# Patient Record
Sex: FEMALE | Race: WHITE | Employment: PART TIME | URBAN - METROPOLITAN AREA
[De-identification: names, ages, dates, MRNs, and addresses within clinical notes are randomized per-mention and may not be internally consistent; named-entity substitution may affect disease eponyms.]

---

## 2018-08-06 ENCOUNTER — HOSPITAL ENCOUNTER (OUTPATIENT)
Age: 38
Discharge: HOME | End: 2018-08-06
Attending: FAMILY MEDICINE
Payer: COMMERCIAL

## 2018-08-06 VITALS
DIASTOLIC BLOOD PRESSURE: 67 MMHG | TEMPERATURE: 98 F | SYSTOLIC BLOOD PRESSURE: 105 MMHG | HEIGHT: 65 IN | BODY MASS INDEX: 24.99 KG/M2 | WEIGHT: 150 LBS | RESPIRATION RATE: 18 BRPM | OXYGEN SATURATION: 100 % | HEART RATE: 65 BPM

## 2018-08-06 DIAGNOSIS — J02.0 STREPTOCOCCAL SORE THROAT: Primary | ICD-10-CM

## 2018-08-06 LAB — S PYO AG THROAT QL: NEGATIVE

## 2018-08-06 PROCEDURE — 99204 OFFICE O/P NEW MOD 45 MIN: CPT

## 2018-08-06 PROCEDURE — 87430 STREP A AG IA: CPT

## 2018-08-06 PROCEDURE — 87081 CULTURE SCREEN ONLY: CPT | Performed by: FAMILY MEDICINE

## 2018-08-06 PROCEDURE — 99203 OFFICE O/P NEW LOW 30 MIN: CPT

## 2018-08-06 RX ORDER — LEVOTHYROXINE SODIUM 175 UG/1
175 TABLET ORAL
COMMUNITY
End: 2019-06-17

## 2018-08-06 NOTE — ED INITIAL ASSESSMENT (HPI)
Pt here with c/o sore throat that started this am. Son was dx with strep yesterday. Denies fevers.  Pt is 20 weeks pregnant

## 2018-08-06 NOTE — ED PROVIDER NOTES
Patient Seen in: [address]    History   Patient presents with:  Sore Throat    Stated Complaint: sore throat    HPI    Patient here with sore throat for <1 days. No travel,son is sick contact.   Patient denies sig short edema    SKIN: good skin turgor, no obvious rashes  DDX: strep vs. Viral pharyngitis vs. uri    ED Course     Labs Reviewed   EMH POCT RAPID STREP - Normal   GRP A STREP CULT, THROAT       MDM     45year-old with less than 12 hours of throat pain.   Shira Hernández

## 2019-06-11 ENCOUNTER — OFFICE VISIT (OUTPATIENT)
Dept: FAMILY MEDICINE CLINIC | Facility: CLINIC | Age: 39
End: 2019-06-11
Payer: COMMERCIAL

## 2019-06-11 ENCOUNTER — LAB ENCOUNTER (OUTPATIENT)
Dept: LAB | Facility: REFERENCE LAB | Age: 39
End: 2019-06-11
Attending: FAMILY MEDICINE
Payer: COMMERCIAL

## 2019-06-11 ENCOUNTER — HOSPITAL ENCOUNTER (OUTPATIENT)
Dept: GENERAL RADIOLOGY | Age: 39
Discharge: HOME | End: 2019-06-11
Attending: FAMILY MEDICINE
Payer: COMMERCIAL

## 2019-06-11 VITALS
DIASTOLIC BLOOD PRESSURE: 70 MMHG | HEART RATE: 75 BPM | WEIGHT: 143 LBS | OXYGEN SATURATION: 97 % | BODY MASS INDEX: 23.82 KG/M2 | HEIGHT: 65 IN | SYSTOLIC BLOOD PRESSURE: 100 MMHG

## 2019-06-11 DIAGNOSIS — R07.9 CHEST PAIN, UNSPECIFIED TYPE: ICD-10-CM

## 2019-06-11 DIAGNOSIS — E03.9 HYPOTHYROIDISM (ACQUIRED): ICD-10-CM

## 2019-06-11 DIAGNOSIS — Z00.01 ENCOUNTER FOR ROUTINE ADULT HEALTH EXAMINATION WITH ABNORMAL FINDINGS: ICD-10-CM

## 2019-06-11 DIAGNOSIS — K64.4 EXTERNAL HEMORRHOIDS: ICD-10-CM

## 2019-06-11 DIAGNOSIS — Z87.898 HISTORY OF ATYPICAL NEVUS: ICD-10-CM

## 2019-06-11 DIAGNOSIS — Z00.01 ENCOUNTER FOR ROUTINE ADULT HEALTH EXAMINATION WITH ABNORMAL FINDINGS: Primary | ICD-10-CM

## 2019-06-11 PROCEDURE — 80061 LIPID PANEL: CPT

## 2019-06-11 PROCEDURE — 93000 ELECTROCARDIOGRAM COMPLETE: CPT | Performed by: FAMILY MEDICINE

## 2019-06-11 PROCEDURE — 84481 FREE ASSAY (FT-3): CPT

## 2019-06-11 PROCEDURE — 99385 PREV VISIT NEW AGE 18-39: CPT | Performed by: FAMILY MEDICINE

## 2019-06-11 PROCEDURE — 83036 HEMOGLOBIN GLYCOSYLATED A1C: CPT

## 2019-06-11 PROCEDURE — 71046 X-RAY EXAM CHEST 2 VIEWS: CPT | Performed by: FAMILY MEDICINE

## 2019-06-11 PROCEDURE — 84443 ASSAY THYROID STIM HORMONE: CPT

## 2019-06-11 PROCEDURE — 84439 ASSAY OF FREE THYROXINE: CPT

## 2019-06-11 PROCEDURE — 85025 COMPLETE CBC W/AUTO DIFF WBC: CPT

## 2019-06-11 PROCEDURE — 36415 COLL VENOUS BLD VENIPUNCTURE: CPT

## 2019-06-11 PROCEDURE — 99203 OFFICE O/P NEW LOW 30 MIN: CPT | Performed by: FAMILY MEDICINE

## 2019-06-11 PROCEDURE — 80053 COMPREHEN METABOLIC PANEL: CPT

## 2019-06-11 NOTE — PATIENT INSTRUCTIONS
Treating Hemorrhoids: Removal  If your symptoms persist, your healthcare provider may recommend removing the hemorrhoid. This can be done in your healthcare provider's office or at a surgical center. In most cases, no special preparation is needed.  Keep · Numbing the hemorrhoid. You’ll be asked to lie or kneel on a table. The hemorrhoid is then injected with a local anesthetic. This may cause some discomfort for a moment.  But within a short time your healthcare provider will be able to remove the hemorrho Screening tests and vaccines are an important part of managing your health. A screening test is done to find possible disorders or diseases in people who don't have any symptoms.  The goal is to find a disease early so lifestyle changes can be made and you Type 2 diabetes All women with prediabetes Every year   Gonorrhea Sexually active women at increased risk for infection At routine exams   Hepatitis C Anyone at increased risk At routine exams   HIV All women should be tested at least once for HIV between Meningococcal Women at increased risk for infection should talk with their healthcare provider 1 or more doses   Pneumococcal conjugate vaccine (PCV13) and pneumococcal polysaccharide vaccine (PPSV23) Women at increased risk for infection should talk with

## 2019-06-14 ENCOUNTER — MED REC SCAN ONLY (OUTPATIENT)
Dept: FAMILY MEDICINE CLINIC | Facility: CLINIC | Age: 39
End: 2019-06-14

## 2019-09-17 ENCOUNTER — LAB ENCOUNTER (OUTPATIENT)
Dept: LAB | Facility: REFERENCE LAB | Age: 39
End: 2019-09-17
Attending: FAMILY MEDICINE
Payer: COMMERCIAL

## 2019-09-17 ENCOUNTER — HOSPITAL ENCOUNTER (OUTPATIENT)
Dept: ULTRASOUND IMAGING | Age: 39
Discharge: HOME | End: 2019-09-17
Attending: FAMILY MEDICINE
Payer: COMMERCIAL

## 2019-09-17 DIAGNOSIS — E03.9 HYPOTHYROIDISM (ACQUIRED): ICD-10-CM

## 2019-09-17 DIAGNOSIS — E04.1 THYROID NODULE: ICD-10-CM

## 2019-09-17 LAB — TSI SER-ACNC: 0.07 MIU/ML (ref 0.36–3.74)

## 2019-09-17 PROCEDURE — 36415 COLL VENOUS BLD VENIPUNCTURE: CPT

## 2019-09-17 PROCEDURE — 76536 US EXAM OF HEAD AND NECK: CPT | Performed by: FAMILY MEDICINE

## 2019-09-17 PROCEDURE — 84481 FREE ASSAY (FT-3): CPT

## 2019-09-17 PROCEDURE — 84443 ASSAY THYROID STIM HORMONE: CPT

## 2019-09-17 PROCEDURE — 84439 ASSAY OF FREE THYROXINE: CPT

## 2019-09-18 LAB
T3FREE SERPL-MCNC: 2.69 PG/ML (ref 2.4–4.2)
T4 FREE SERPL-MCNC: 1.2 NG/DL (ref 0.8–1.7)

## 2019-10-08 ENCOUNTER — OFFICE VISIT (OUTPATIENT)
Dept: FAMILY MEDICINE CLINIC | Facility: CLINIC | Age: 39
End: 2019-10-08
Payer: COMMERCIAL

## 2019-10-08 VITALS
OXYGEN SATURATION: 98 % | HEART RATE: 70 BPM | WEIGHT: 138 LBS | DIASTOLIC BLOOD PRESSURE: 64 MMHG | BODY MASS INDEX: 22.99 KG/M2 | HEIGHT: 65 IN | SYSTOLIC BLOOD PRESSURE: 100 MMHG

## 2019-10-08 DIAGNOSIS — M25.511 ACUTE PAIN OF RIGHT SHOULDER: Primary | ICD-10-CM

## 2019-10-08 PROCEDURE — 90686 IIV4 VACC NO PRSV 0.5 ML IM: CPT | Performed by: FAMILY MEDICINE

## 2019-10-08 PROCEDURE — 90471 IMMUNIZATION ADMIN: CPT | Performed by: FAMILY MEDICINE

## 2019-10-08 PROCEDURE — 99213 OFFICE O/P EST LOW 20 MIN: CPT | Performed by: FAMILY MEDICINE

## 2019-10-08 NOTE — PROGRESS NOTES
HPI:    Patient ID: Sierra Rodriguez is a 44year old female who presents for shoulder pain and flu shot. HPI  Right shoulder. Started 2 months ago. No initial trauma or injury. Has 9mo daughter, carrying her a lot. No other change in activity. Cervical back: Normal.   SPECIAL TESTS: Positive empty can on right (pain). No pain w/ resisted IR or ER. Neer's negative. Crossarm negative. Ritchie's negative.            ASSESSMENT/PLAN:   Acute pain of right shoulder  (primary encounter diagnosis)

## 2019-10-14 ENCOUNTER — MED REC SCAN ONLY (OUTPATIENT)
Dept: FAMILY MEDICINE CLINIC | Facility: CLINIC | Age: 39
End: 2019-10-14

## 2020-01-09 DIAGNOSIS — E03.9 HYPOTHYROIDISM (ACQUIRED): ICD-10-CM

## 2020-01-11 DIAGNOSIS — E03.9 HYPOTHYROIDISM (ACQUIRED): ICD-10-CM

## 2020-01-11 RX ORDER — LEVOTHYROXINE SODIUM 0.12 MG/1
TABLET ORAL
Qty: 30 TABLET | Refills: 0 | Status: SHIPPED | OUTPATIENT
Start: 2020-01-11 | End: 2020-03-04

## 2020-01-13 RX ORDER — LEVOTHYROXINE SODIUM 0.12 MG/1
TABLET ORAL
Qty: 90 TABLET | Refills: 0 | OUTPATIENT
Start: 2020-01-13

## 2020-03-06 ENCOUNTER — LAB ENCOUNTER (OUTPATIENT)
Dept: LAB | Facility: REFERENCE LAB | Age: 40
End: 2020-03-06
Attending: FAMILY MEDICINE
Payer: COMMERCIAL

## 2020-03-06 DIAGNOSIS — E03.9 HYPOTHYROIDISM (ACQUIRED): ICD-10-CM

## 2020-03-06 LAB
T4 FREE SERPL-MCNC: 0.8 NG/DL (ref 0.8–1.7)
TSI SER-ACNC: 11.6 MIU/ML (ref 0.36–3.74)

## 2020-03-06 PROCEDURE — 36415 COLL VENOUS BLD VENIPUNCTURE: CPT

## 2020-03-06 PROCEDURE — 84439 ASSAY OF FREE THYROXINE: CPT

## 2020-03-06 PROCEDURE — 84443 ASSAY THYROID STIM HORMONE: CPT

## 2020-03-08 DIAGNOSIS — E03.9 HYPOTHYROIDISM (ACQUIRED): ICD-10-CM

## 2020-03-08 RX ORDER — LEVOTHYROXINE SODIUM 137 UG/1
137 TABLET ORAL
Qty: 90 TABLET | Refills: 0 | Status: SHIPPED | OUTPATIENT
Start: 2020-03-08 | End: 2020-06-16

## 2020-06-16 DIAGNOSIS — E03.9 HYPOTHYROIDISM (ACQUIRED): ICD-10-CM

## 2020-06-16 RX ORDER — LEVOTHYROXINE SODIUM 137 UG/1
TABLET ORAL
Qty: 30 TABLET | Refills: 0 | Status: SHIPPED | OUTPATIENT
Start: 2020-06-16 | End: 2020-07-16

## 2020-06-16 RX ORDER — LEVOTHYROXINE SODIUM 137 UG/1
TABLET ORAL
Qty: 90 TABLET | Refills: 0 | OUTPATIENT
Start: 2020-06-16

## 2020-06-26 ENCOUNTER — OFFICE VISIT (OUTPATIENT)
Dept: FAMILY MEDICINE CLINIC | Facility: CLINIC | Age: 40
End: 2020-06-26
Payer: COMMERCIAL

## 2020-06-26 VITALS
OXYGEN SATURATION: 98 % | BODY MASS INDEX: 24.66 KG/M2 | DIASTOLIC BLOOD PRESSURE: 70 MMHG | HEART RATE: 79 BPM | SYSTOLIC BLOOD PRESSURE: 116 MMHG | HEIGHT: 65 IN | WEIGHT: 148 LBS

## 2020-06-26 DIAGNOSIS — E03.9 HYPOTHYROIDISM (ACQUIRED): ICD-10-CM

## 2020-06-26 DIAGNOSIS — Z00.01 ENCOUNTER FOR ROUTINE ADULT HEALTH EXAMINATION WITH ABNORMAL FINDINGS: Primary | ICD-10-CM

## 2020-06-26 DIAGNOSIS — F41.9 ANXIETY: ICD-10-CM

## 2020-06-26 DIAGNOSIS — L98.9 SKIN LESION OF FACE: ICD-10-CM

## 2020-06-26 DIAGNOSIS — K64.4 HEMORRHOIDS, EXTERNAL: ICD-10-CM

## 2020-06-26 PROCEDURE — 99395 PREV VISIT EST AGE 18-39: CPT | Performed by: FAMILY MEDICINE

## 2020-06-26 PROCEDURE — 99213 OFFICE O/P EST LOW 20 MIN: CPT | Performed by: FAMILY MEDICINE

## 2020-06-26 RX ORDER — LORAZEPAM 0.5 MG/1
0.5 TABLET ORAL DAILY PRN
Qty: 30 TABLET | Refills: 0 | Status: SHIPPED | OUTPATIENT
Start: 2020-06-26 | End: 2020-12-15

## 2020-06-26 RX ORDER — FLUOXETINE 10 MG/1
CAPSULE ORAL
Qty: 14 CAPSULE | Refills: 0 | Status: SHIPPED | OUTPATIENT
Start: 2020-06-26 | End: 2020-12-28 | Stop reason: DRUGHIGH

## 2020-06-26 RX ORDER — FLUOXETINE HYDROCHLORIDE 20 MG/1
CAPSULE ORAL
Qty: 90 CAPSULE | Refills: 0 | Status: SHIPPED | OUTPATIENT
Start: 2020-06-26 | End: 2020-09-23

## 2020-06-26 NOTE — PATIENT INSTRUCTIONS
Treating Anxiety Disorders with Medicine  An anxiety disorder can make you feel nervous or apprehensive, even without a clear reason.  In people age 72 and older, generalized anxiety disorder is one of the most commonly diagnosed anxiety disorders. Many t Never use alcohol or other drugs with anti-anxiety medicines. This could result in loss of muscular control, sedation, coma, or death. Also, use only the amount of medicine prescribed for you.  If you think you may have taken too much, get emergency care ri · Addiction. If Trudy Tobias never had a problem with drugs or alcohol, you may not have a problem with medicines used to treat anxiety disorders. But always discuss the medicines with your healthcare provider before taking them.  If you have a history of addicti · Do a bowel preparation, such as an enema, if told to do so.   Risks and possible complications   The risks and complications for this surgery are:   · Infection  · Bleeding  · Trouble urinating  · Narrowing of the anal canal (very rare)  The day of surger You’ll be taken to a recovery area to rest for a while. You can often go home the same day. But in some cases, you may need to stay in the hospital overnight. For a short time after surgery, you may have nausea, minor bleeding, and discharge.  You’ll also l If your blood pressure reading is higher than normal, follow the advice of your healthcare provider   Breast cancer All women in this age group should talk with their healthcare providers about the need for clinical breast exams (CBE)1 Clinical breast exam Chickenpox (varicella) All women in this age group who have no record of this infection or vaccine 2 doses; the second dose should be given 4 to 8 weeks after the first dose   Hepatitis A Women at increased risk for infection should talk with their healthc BRCA gene mutation testing for breast and ovarian cancer susceptibility Women with increased risk for having gene mutation When your risk is known   Breast cancer and chemoprevention Women at high risk for breast cancer When your risk is known   Diet and e

## 2020-06-26 NOTE — PROGRESS NOTES
HPI:   Ricardo Runner is a 44year old female who presents for a complete physical exam.     Has had increased anxiety since becoming a mom and drinking more at night to help her sleep as she often worries about things.  Has been seeing a therapist fo MG Oral Tab Take 1 tablet (0.5 mg total) by mouth daily as needed for Anxiety.  30 tablet 0   • LEVOTHYROXINE SODIUM 137 MCG Oral Tab TAKE 1 TABLET BY MOUTH DAILY BEFORE BREAKFAST 30 tablet 0     No Known Allergies   Past Medical History:   Diagnosis Date (mg/dL)   Date Value   06/11/2019 199     HDL Cholesterol (mg/dL)   Date Value   06/11/2019 91 (H)     LDL Cholesterol (mg/dL)   Date Value   06/11/2019 91      ASSESSMENT AND PLAN:   Sierra Rodriguez is a 44year old female who presents for a complete desires  -Recommendation for yearly influenza vaccine  -Need for Tdap once as an adult and Td booster every 10 years  -Need for Zoster vaccine for patients >= 48  -Contraception:  had vasectomy   -STI screening (GC/Chlamydia/HIV): Not indicated   -H

## 2020-07-15 ENCOUNTER — LAB ENCOUNTER (OUTPATIENT)
Dept: LAB | Facility: REFERENCE LAB | Age: 40
End: 2020-07-15
Attending: FAMILY MEDICINE
Payer: COMMERCIAL

## 2020-07-15 DIAGNOSIS — E03.9 HYPOTHYROIDISM (ACQUIRED): ICD-10-CM

## 2020-07-15 DIAGNOSIS — Z00.01 ENCOUNTER FOR ROUTINE ADULT HEALTH EXAMINATION WITH ABNORMAL FINDINGS: ICD-10-CM

## 2020-07-15 LAB
ALBUMIN SERPL-MCNC: 4.3 G/DL (ref 3.4–5)
ALBUMIN/GLOB SERPL: 1.4 {RATIO} (ref 1–2)
ALP LIVER SERPL-CCNC: 63 U/L (ref 37–98)
ALT SERPL-CCNC: 34 U/L (ref 13–56)
ANION GAP SERPL CALC-SCNC: 6 MMOL/L (ref 0–18)
AST SERPL-CCNC: 25 U/L (ref 15–37)
BASOPHILS # BLD AUTO: 0.04 X10(3) UL (ref 0–0.2)
BASOPHILS NFR BLD AUTO: 0.9 %
BILIRUB SERPL-MCNC: 0.8 MG/DL (ref 0.1–2)
BUN BLD-MCNC: 13 MG/DL (ref 7–18)
BUN/CREAT SERPL: 18.1 (ref 10–20)
CALCIUM BLD-MCNC: 8.8 MG/DL (ref 8.5–10.1)
CHLORIDE SERPL-SCNC: 106 MMOL/L (ref 98–112)
CHOLEST SMN-MCNC: 181 MG/DL (ref ?–200)
CO2 SERPL-SCNC: 27 MMOL/L (ref 21–32)
CREAT BLD-MCNC: 0.72 MG/DL (ref 0.55–1.02)
DEPRECATED RDW RBC AUTO: 39.3 FL (ref 35.1–46.3)
EOSINOPHIL # BLD AUTO: 0.13 X10(3) UL (ref 0–0.7)
EOSINOPHIL NFR BLD AUTO: 2.9 %
ERYTHROCYTE [DISTWIDTH] IN BLOOD BY AUTOMATED COUNT: 11.4 % (ref 11–15)
EST. AVERAGE GLUCOSE BLD GHB EST-MCNC: 85 MG/DL (ref 68–126)
GLOBULIN PLAS-MCNC: 3.1 G/DL (ref 2.8–4.4)
GLUCOSE BLD-MCNC: 79 MG/DL (ref 70–99)
HBA1C MFR BLD HPLC: 4.6 % (ref ?–5.7)
HCT VFR BLD AUTO: 40.8 % (ref 35–48)
HCV AB SERPL QL IA: NONREACTIVE
HDLC SERPL-MCNC: 83 MG/DL (ref 40–59)
HGB BLD-MCNC: 14 G/DL (ref 12–16)
IMM GRANULOCYTES # BLD AUTO: 0.01 X10(3) UL (ref 0–1)
IMM GRANULOCYTES NFR BLD: 0.2 %
LDLC SERPL CALC-MCNC: 86 MG/DL (ref ?–100)
LYMPHOCYTES # BLD AUTO: 1.84 X10(3) UL (ref 1–4)
LYMPHOCYTES NFR BLD AUTO: 40.4 %
M PROTEIN MFR SERPL ELPH: 7.4 G/DL (ref 6.4–8.2)
MCH RBC QN AUTO: 32.1 PG (ref 26–34)
MCHC RBC AUTO-ENTMCNC: 34.3 G/DL (ref 31–37)
MCV RBC AUTO: 93.6 FL (ref 80–100)
MONOCYTES # BLD AUTO: 0.41 X10(3) UL (ref 0.1–1)
MONOCYTES NFR BLD AUTO: 9 %
NEUTROPHILS # BLD AUTO: 2.12 X10 (3) UL (ref 1.5–7.7)
NEUTROPHILS # BLD AUTO: 2.12 X10(3) UL (ref 1.5–7.7)
NEUTROPHILS NFR BLD AUTO: 46.6 %
NONHDLC SERPL-MCNC: 98 MG/DL (ref ?–130)
OSMOLALITY SERPL CALC.SUM OF ELEC: 287 MOSM/KG (ref 275–295)
PATIENT FASTING Y/N/NP: NO
PATIENT FASTING Y/N/NP: NO
PLATELET # BLD AUTO: 229 10(3)UL (ref 150–450)
POTASSIUM SERPL-SCNC: 4.2 MMOL/L (ref 3.5–5.1)
RBC # BLD AUTO: 4.36 X10(6)UL (ref 3.8–5.3)
SODIUM SERPL-SCNC: 139 MMOL/L (ref 136–145)
TRIGL SERPL-MCNC: 60 MG/DL (ref 30–149)
TSI SER-ACNC: 1.5 MIU/ML (ref 0.36–3.74)
VLDLC SERPL CALC-MCNC: 12 MG/DL (ref 0–30)
WBC # BLD AUTO: 4.6 X10(3) UL (ref 4–11)

## 2020-07-15 PROCEDURE — 85025 COMPLETE CBC W/AUTO DIFF WBC: CPT

## 2020-07-15 PROCEDURE — 84443 ASSAY THYROID STIM HORMONE: CPT

## 2020-07-15 PROCEDURE — 80061 LIPID PANEL: CPT

## 2020-07-15 PROCEDURE — 83036 HEMOGLOBIN GLYCOSYLATED A1C: CPT

## 2020-07-15 PROCEDURE — 36415 COLL VENOUS BLD VENIPUNCTURE: CPT

## 2020-07-15 PROCEDURE — 80053 COMPREHEN METABOLIC PANEL: CPT

## 2020-07-15 PROCEDURE — 86803 HEPATITIS C AB TEST: CPT

## 2020-07-15 NOTE — TELEPHONE ENCOUNTER
A refill request was received for:  Requested Prescriptions     Pending Prescriptions Disp Refills   • LEVOTHYROXINE SODIUM 137 MCG Oral Tab [Pharmacy Med Name: LEVOTHYROXINE 0.137MG (137MCG) TAB] 90 tablet 0     Sig: TAKE 1 TABLET BY MOUTH DAILY BEFORE BR

## 2020-07-16 RX ORDER — LEVOTHYROXINE SODIUM 137 UG/1
TABLET ORAL
Qty: 90 TABLET | Refills: 0 | Status: SHIPPED | OUTPATIENT
Start: 2020-07-16 | End: 2020-10-20

## 2020-09-23 RX ORDER — FLUOXETINE HYDROCHLORIDE 20 MG/1
CAPSULE ORAL
Qty: 90 CAPSULE | Refills: 0 | Status: SHIPPED | OUTPATIENT
Start: 2020-09-23 | End: 2020-12-28

## 2020-09-23 NOTE — TELEPHONE ENCOUNTER
A refill request was received for:  Requested Prescriptions     Pending Prescriptions Disp Refills   • FLUoxetine HCl 20 MG Oral Cap [Pharmacy Med Name: FLUOXETINE 20MG CAPSULES] 90 capsule 0     Sig: TAKE 1 CAPSULE BY MOUTH DAILY AS TOLERATED     Last ref

## 2020-10-20 DIAGNOSIS — E03.9 HYPOTHYROIDISM (ACQUIRED): ICD-10-CM

## 2020-10-20 RX ORDER — LEVOTHYROXINE SODIUM 137 UG/1
TABLET ORAL
Qty: 90 TABLET | Refills: 0 | Status: SHIPPED | OUTPATIENT
Start: 2020-10-20 | End: 2021-01-23

## 2020-12-15 RX ORDER — LORAZEPAM 0.5 MG/1
0.5 TABLET ORAL DAILY PRN
Qty: 30 TABLET | Refills: 0 | Status: SHIPPED | OUTPATIENT
Start: 2020-12-15 | End: 2021-09-14

## 2020-12-28 RX ORDER — FLUOXETINE HYDROCHLORIDE 20 MG/1
CAPSULE ORAL
Qty: 90 CAPSULE | Refills: 0 | Status: SHIPPED | OUTPATIENT
Start: 2020-12-28 | End: 2021-03-29

## 2020-12-28 NOTE — TELEPHONE ENCOUNTER
A refill request was received for:  Requested Prescriptions     Pending Prescriptions Disp Refills   • FLUoxetine HCl 20 MG Oral Cap 90 capsule 0     Sig: TAKE 1 CAPSULE BY MOUTH DAILY AS TOLERATED     Last refill date: 09/23/2020  Qty: 90  Last office vis

## 2021-01-07 PROBLEM — F41.9 ANXIETY: Status: ACTIVE | Noted: 2021-01-07

## 2021-01-07 NOTE — PROGRESS NOTES
CC:  Patient presents with: Follow - Up: medication follow up      HPI: 36year old female presenting for video visit to follow-up on anxiety after starting Fluoxetine. Feels an improvement in her anxiety since starting Fluoxetine in June.   Does use Tran Monge Minutes per session: Not on file      Stress: Not on file    Relationships      Social connections        Talks on phone: Not on file        Gets together: Not on file        Attends [employer] service: Not on file        Active member of club or organizati rights reserved. Reproduced with permission.     Current Outpatient Medications   Medication Sig Dispense Refill   • FLUoxetine HCl 20 MG Oral Cap TAKE 1 CAPSULE BY MOUTH DAILY AS TOLERATED 90 capsule 0   • LORazepam 0.5 MG Oral Tab Take 1 tablet (0.5 mg to

## 2021-01-23 DIAGNOSIS — E03.9 HYPOTHYROIDISM (ACQUIRED): ICD-10-CM

## 2021-01-24 RX ORDER — LEVOTHYROXINE SODIUM 137 UG/1
137 TABLET ORAL
Qty: 90 TABLET | Refills: 1 | Status: SHIPPED | OUTPATIENT
Start: 2021-01-24 | End: 2021-07-28

## 2021-02-23 ENCOUNTER — PATIENT MESSAGE (OUTPATIENT)
Dept: FAMILY MEDICINE CLINIC | Facility: CLINIC | Age: 41
End: 2021-02-23

## 2021-02-25 ENCOUNTER — TELEPHONE (OUTPATIENT)
Dept: FAMILY MEDICINE CLINIC | Facility: CLINIC | Age: 41
End: 2021-02-25

## 2021-02-25 NOTE — TELEPHONE ENCOUNTER
Pt called to speak to Joanie Patient. I informed her Joanie Patient is not in yet. Pt stated she was calling to provide a fax #. Please fax attn.  Camden Mixon  Fax [phone]

## 2021-02-25 NOTE — TELEPHONE ENCOUNTER
February 24, 2021  Helene Vera  to [employer] [place] 2:37 PM  I received the form it said it would be a total of 3 pages but only received the fax cover and one other sheet that says attn Dr. Funmi Colon at the bottom I believe this is the o

## 2021-02-26 ENCOUNTER — MED REC SCAN ONLY (OUTPATIENT)
Dept: FAMILY MEDICINE CLINIC | Facility: CLINIC | Age: 41
End: 2021-02-26

## 2021-03-29 RX ORDER — FLUOXETINE HYDROCHLORIDE 20 MG/1
CAPSULE ORAL
Qty: 90 CAPSULE | Refills: 0 | Status: SHIPPED | OUTPATIENT
Start: 2021-03-29 | End: 2021-07-12

## 2021-07-12 RX ORDER — FLUOXETINE HYDROCHLORIDE 20 MG/1
CAPSULE ORAL
Qty: 90 CAPSULE | Refills: 0 | Status: SHIPPED | OUTPATIENT
Start: 2021-07-12 | End: 2021-10-13

## 2021-07-28 ENCOUNTER — TELEPHONE (OUTPATIENT)
Dept: FAMILY MEDICINE CLINIC | Facility: CLINIC | Age: 41
End: 2021-07-28

## 2021-07-28 DIAGNOSIS — E03.9 HYPOTHYROIDISM (ACQUIRED): ICD-10-CM

## 2021-07-28 DIAGNOSIS — Z12.31 ENCOUNTER FOR MAMMOGRAM TO ESTABLISH BASELINE MAMMOGRAM: Primary | ICD-10-CM

## 2021-07-28 RX ORDER — LEVOTHYROXINE SODIUM 137 UG/1
137 TABLET ORAL
Qty: 90 TABLET | Refills: 0 | Status: SHIPPED | OUTPATIENT
Start: 2021-07-28 | End: 2021-10-26

## 2021-08-16 ENCOUNTER — HOSPITAL ENCOUNTER (OUTPATIENT)
Dept: MAMMOGRAPHY | Age: 41
Discharge: HOME | End: 2021-08-16
Attending: FAMILY MEDICINE
Payer: COMMERCIAL

## 2021-08-16 DIAGNOSIS — Z12.31 ENCOUNTER FOR MAMMOGRAM TO ESTABLISH BASELINE MAMMOGRAM: ICD-10-CM

## 2021-08-16 PROCEDURE — 77063 BREAST TOMOSYNTHESIS BI: CPT | Performed by: FAMILY MEDICINE

## 2021-08-16 PROCEDURE — 77067 SCR MAMMO BI INCL CAD: CPT | Performed by: FAMILY MEDICINE

## 2021-09-14 ENCOUNTER — OFFICE VISIT (OUTPATIENT)
Dept: FAMILY MEDICINE CLINIC | Facility: CLINIC | Age: 41
End: 2021-09-14
Payer: COMMERCIAL

## 2021-09-14 ENCOUNTER — LAB ENCOUNTER (OUTPATIENT)
Dept: LAB | Facility: REFERENCE LAB | Age: 41
End: 2021-09-14
Attending: FAMILY MEDICINE
Payer: COMMERCIAL

## 2021-09-14 VITALS
SYSTOLIC BLOOD PRESSURE: 118 MMHG | WEIGHT: 148 LBS | OXYGEN SATURATION: 99 % | BODY MASS INDEX: 24.66 KG/M2 | HEART RATE: 65 BPM | HEIGHT: 65 IN | DIASTOLIC BLOOD PRESSURE: 74 MMHG

## 2021-09-14 DIAGNOSIS — R23.8 DRY SCALP: ICD-10-CM

## 2021-09-14 DIAGNOSIS — Z00.00 ENCOUNTER FOR ROUTINE ADULT HEALTH EXAMINATION WITHOUT ABNORMAL FINDINGS: Primary | ICD-10-CM

## 2021-09-14 DIAGNOSIS — E03.9 HYPOTHYROIDISM (ACQUIRED): ICD-10-CM

## 2021-09-14 DIAGNOSIS — F41.9 ANXIETY: ICD-10-CM

## 2021-09-14 LAB — TSI SER-ACNC: 2.39 MIU/ML (ref 0.36–3.74)

## 2021-09-14 PROCEDURE — 99396 PREV VISIT EST AGE 40-64: CPT | Performed by: FAMILY MEDICINE

## 2021-09-14 PROCEDURE — 36415 COLL VENOUS BLD VENIPUNCTURE: CPT

## 2021-09-14 PROCEDURE — 3074F SYST BP LT 130 MM HG: CPT | Performed by: FAMILY MEDICINE

## 2021-09-14 PROCEDURE — 84443 ASSAY THYROID STIM HORMONE: CPT

## 2021-09-14 PROCEDURE — 3078F DIAST BP <80 MM HG: CPT | Performed by: FAMILY MEDICINE

## 2021-09-14 PROCEDURE — 3008F BODY MASS INDEX DOCD: CPT | Performed by: FAMILY MEDICINE

## 2021-09-14 RX ORDER — LORAZEPAM 0.5 MG/1
0.5 TABLET ORAL DAILY PRN
Qty: 30 TABLET | Refills: 0 | Status: SHIPPED | OUTPATIENT
Start: 2021-09-14

## 2021-09-14 NOTE — PATIENT INSTRUCTIONS
Prevention Guidelines, Women Ages 36 to 52  Screening tests and vaccines are an important part of managing your health. A screening test is done to find diseases in people who don't have any symptoms.  The goal is to find a disease early so lifestyle curran sigmoidoscopy every 5 years, or  · Colonoscopy every 10 years, or  · CT colonography (virtual colonoscopy) every 5 years, or  · Yearly fecal occult blood test, or  · Yearly fecal immunochemical test every year, or  · Stool DNA test, every 3 years  If you c least 4 weeks after the first dose   Hepatitis A Women at increased risk for infection–talk with your healthcare provider 2 doses given 6 months apart   Hepatitis B Women at increased risk for infection–talk with your healthcare provider 3 doses over 6 mon American Academy of Ophthalmology  Alejandro last reviewed this educational content on 11/1/2017  © 6960-2861 [employer]. All rights reserved. This information is not intended as a substitute for professional medical care.  Always follow your

## 2021-09-14 NOTE — PROGRESS NOTES
HPI:   Aleja Paiz is a 39year old female who presents for a complete physical exam.     Still meets with her therapist virtually about once a month now. Feels like the Fluoxetine is working well and anxiety is much better overall.  Has been tryin Squamous cell carcinoma    • Diabetes Mother    • Heart Disease Father         CABG   • Skin cancer Father    • Asthma Sister       Social History:   Social History    Tobacco Use      Smoking status: Never Smoker      Smokeless tobacco: Never Used    Vapi kg/m².   /74   Pulse 65   Ht 5' 5\" (1.651 m)   Wt 148 lb (67.1 kg)   LMP 09/03/2021   SpO2 99%   BMI 24.63 kg/m²     GENERAL: well developed, well nourished, in no apparent distress   SKIN: no rashes, no suspicious lesions  HEENT: atraumatic, normoc -Diabetes screening which she declines  -Cholesterol screening which she declines  -Recommendation for yearly influenza vaccine  -Need for Tdap once as an adult and Td booster every 10 years  -Need for Zoster vaccine for patients >= 50  -Contraception: H

## 2021-10-13 RX ORDER — FLUOXETINE HYDROCHLORIDE 20 MG/1
CAPSULE ORAL
Qty: 90 CAPSULE | Refills: 2 | Status: SHIPPED | OUTPATIENT
Start: 2021-10-13

## 2021-10-13 NOTE — TELEPHONE ENCOUNTER
A refill request was received for:  Requested Prescriptions     Pending Prescriptions Disp Refills   • FLUOXETINE 20 MG Oral Cap [Pharmacy Med Name: FLUOXETINE 20MG CAPSULES] 90 capsule 0     Sig: TAKE 1 CAPSULE BY MOUTH DAILY AS TOLERATED     Last refill

## 2021-10-26 DIAGNOSIS — E03.9 HYPOTHYROIDISM (ACQUIRED): ICD-10-CM

## 2021-10-26 RX ORDER — LEVOTHYROXINE SODIUM 137 UG/1
TABLET ORAL
Qty: 90 TABLET | Refills: 0 | Status: SHIPPED | OUTPATIENT
Start: 2021-10-26 | End: 2022-01-28

## 2022-01-27 DIAGNOSIS — E03.9 HYPOTHYROIDISM (ACQUIRED): ICD-10-CM

## 2022-01-28 RX ORDER — LEVOTHYROXINE SODIUM 137 UG/1
TABLET ORAL
Qty: 90 TABLET | Refills: 0 | Status: SHIPPED | OUTPATIENT
Start: 2022-01-28

## 2022-02-16 RX ORDER — LORAZEPAM 0.5 MG/1
0.5 TABLET ORAL
Qty: 30 TABLET | Refills: 0 | Status: SHIPPED | OUTPATIENT
Start: 2022-02-16 | End: 2022-07-21

## 2022-05-04 RX ORDER — LEVOTHYROXINE SODIUM 137 UG/1
137 TABLET ORAL
Qty: 90 TABLET | Refills: 1 | Status: SHIPPED | OUTPATIENT
Start: 2022-05-04

## 2022-05-04 NOTE — TELEPHONE ENCOUNTER
Refill passed per [facility] protocol.    Requested Prescriptions   Pending Prescriptions Disp Refills    LEVOTHYROXINE 137 MCG Oral Tab [Pharmacy Med Name: LEVOTHYROXINE 0.137MG (137MCG) TAB] 90 tablet 0     Sig: TAKE 1 TABLET BY MOUTH BEFORE BREAKFAST        Thyroid Medication Protocol Passed - 5/4/2022  8:09 AM        Passed - TSH in past 12 months        Passed - Last TSH value is normal     Lab Results   Component Value Date    TSH 2.390 09/14/2021                 Passed - Appointment in past 12 or next 3 months             Recent Outpatient Visits              7 months ago Encounter for routine adult health examination without abnormal findings    [address], [place]    Office Visit    1 year ago [address] [facility]    [facility]    1 year ago Encounter for routine adult health examination with abnormal findings    [address], [facility] Brandyn Lockhart Visit    2 years ago Acute pain of right shoulder    [address] [place] [address] 183 Karma Harper DO    Office Visit    2 years ago Encounter for routine adult health examination with abnormal findings    [address] [place], [place] [address]Ramya Consuela Amas, [facility] Brandyn Lockhart Visit           Future Appointments         Provider Department Appt Notes    In 1 week Juan Sprague, [address] [place] [address] 183 Daytime sleepiness

## 2022-05-11 ENCOUNTER — OFFICE VISIT (OUTPATIENT)
Dept: FAMILY MEDICINE CLINIC | Facility: CLINIC | Age: 42
End: 2022-05-11
Payer: COMMERCIAL

## 2022-05-11 ENCOUNTER — LAB ENCOUNTER (OUTPATIENT)
Dept: LAB | Facility: REFERENCE LAB | Age: 42
End: 2022-05-11
Attending: FAMILY MEDICINE
Payer: COMMERCIAL

## 2022-05-11 VITALS
DIASTOLIC BLOOD PRESSURE: 62 MMHG | HEART RATE: 72 BPM | OXYGEN SATURATION: 98 % | WEIGHT: 146 LBS | HEIGHT: 65 IN | BODY MASS INDEX: 24.32 KG/M2 | SYSTOLIC BLOOD PRESSURE: 104 MMHG

## 2022-05-11 DIAGNOSIS — Z51.81 MEDICATION MONITORING ENCOUNTER: ICD-10-CM

## 2022-05-11 DIAGNOSIS — R53.83 FATIGUE, UNSPECIFIED TYPE: ICD-10-CM

## 2022-05-11 DIAGNOSIS — R40.0 DAYTIME SOMNOLENCE: Primary | ICD-10-CM

## 2022-05-11 LAB
ALBUMIN SERPL-MCNC: 4.5 G/DL (ref 3.4–5)
ALBUMIN/GLOB SERPL: 1.3 {RATIO} (ref 1–2)
ALP LIVER SERPL-CCNC: 65 U/L
ALT SERPL-CCNC: 32 U/L
ANION GAP SERPL CALC-SCNC: 5 MMOL/L (ref 0–18)
AST SERPL-CCNC: 23 U/L (ref 15–37)
BASOPHILS # BLD AUTO: 0.03 X10(3) UL (ref 0–0.2)
BASOPHILS NFR BLD AUTO: 0.5 %
BILIRUB SERPL-MCNC: 0.8 MG/DL (ref 0.1–2)
BUN BLD-MCNC: 19 MG/DL (ref 7–18)
BUN/CREAT SERPL: 22.1 (ref 10–20)
CALCIUM BLD-MCNC: 9.9 MG/DL (ref 8.5–10.1)
CHLORIDE SERPL-SCNC: 102 MMOL/L (ref 98–112)
CO2 SERPL-SCNC: 32 MMOL/L (ref 21–32)
CREAT BLD-MCNC: 0.86 MG/DL
DEPRECATED RDW RBC AUTO: 38.8 FL (ref 35.1–46.3)
EOSINOPHIL # BLD AUTO: 0.15 X10(3) UL (ref 0–0.7)
EOSINOPHIL NFR BLD AUTO: 2.6 %
ERYTHROCYTE [DISTWIDTH] IN BLOOD BY AUTOMATED COUNT: 11.3 % (ref 11–15)
FASTING STATUS PATIENT QL REPORTED: NO
GLOBULIN PLAS-MCNC: 3.5 G/DL (ref 2.8–4.4)
GLUCOSE BLD-MCNC: 87 MG/DL (ref 70–99)
HCT VFR BLD AUTO: 42.1 %
HGB BLD-MCNC: 14 G/DL
IMM GRANULOCYTES # BLD AUTO: 0.01 X10(3) UL (ref 0–1)
IMM GRANULOCYTES NFR BLD: 0.2 %
LYMPHOCYTES # BLD AUTO: 1.84 X10(3) UL (ref 1–4)
LYMPHOCYTES NFR BLD AUTO: 31.8 %
MCH RBC QN AUTO: 31.4 PG (ref 26–34)
MCHC RBC AUTO-ENTMCNC: 33.3 G/DL (ref 31–37)
MCV RBC AUTO: 94.4 FL
MONOCYTES # BLD AUTO: 0.56 X10(3) UL (ref 0.1–1)
MONOCYTES NFR BLD AUTO: 9.7 %
NEUTROPHILS # BLD AUTO: 3.2 X10 (3) UL (ref 1.5–7.7)
NEUTROPHILS # BLD AUTO: 3.2 X10(3) UL (ref 1.5–7.7)
NEUTROPHILS NFR BLD AUTO: 55.2 %
OSMOLALITY SERPL CALC.SUM OF ELEC: 290 MOSM/KG (ref 275–295)
PLATELET # BLD AUTO: 254 10(3)UL (ref 150–450)
POTASSIUM SERPL-SCNC: 4 MMOL/L (ref 3.5–5.1)
PROT SERPL-MCNC: 8 G/DL (ref 6.4–8.2)
RBC # BLD AUTO: 4.46 X10(6)UL
SODIUM SERPL-SCNC: 139 MMOL/L (ref 136–145)
TSI SER-ACNC: 1.55 MIU/ML (ref 0.36–3.74)
VIT D+METAB SERPL-MCNC: 29.6 NG/ML (ref 30–100)
WBC # BLD AUTO: 5.8 X10(3) UL (ref 4–11)

## 2022-05-11 PROCEDURE — 85025 COMPLETE CBC W/AUTO DIFF WBC: CPT

## 2022-05-11 PROCEDURE — 84443 ASSAY THYROID STIM HORMONE: CPT

## 2022-05-11 PROCEDURE — 36415 COLL VENOUS BLD VENIPUNCTURE: CPT | Performed by: FAMILY MEDICINE

## 2022-05-11 PROCEDURE — 99214 OFFICE O/P EST MOD 30 MIN: CPT | Performed by: FAMILY MEDICINE

## 2022-05-11 PROCEDURE — 82306 VITAMIN D 25 HYDROXY: CPT | Performed by: FAMILY MEDICINE

## 2022-05-11 PROCEDURE — 80053 COMPREHEN METABOLIC PANEL: CPT

## 2022-05-11 PROCEDURE — 3074F SYST BP LT 130 MM HG: CPT | Performed by: FAMILY MEDICINE

## 2022-05-11 PROCEDURE — 3078F DIAST BP <80 MM HG: CPT | Performed by: FAMILY MEDICINE

## 2022-05-11 PROCEDURE — 3008F BODY MASS INDEX DOCD: CPT | Performed by: FAMILY MEDICINE

## 2022-05-15 ENCOUNTER — TELEPHONE (OUTPATIENT)
Dept: INTEGRATIVE MEDICINE | Facility: CLINIC | Age: 42
End: 2022-05-15

## 2022-05-15 NOTE — TELEPHONE ENCOUNTER
DOS: 5/13/22    Paged by answering service regarding recent COVID diagnosis. Patient reports recent onset of mild fever, nasal congestion and fatigue. Reports testing COVID-positive. Notes no chest pain, shortness of breath, increased work of breathing or cough. She states she is vaccinated and boosted. She has had positive sick contacts. No other concerns at this time. A/P: COVID infection mild to moderate symptoms.     Discussed risks and benefits of Paxlovid use  Supportive measures for symptom alleviation discussed  Will follow CDC isolation criteria at this time  Emergency return precautions discussed and verbal understanding obtained  Nutrient/supplements support advised  Patient agreement with care plan    Will call back if desire to use antivirals    Total time spent on phone 8 minutes

## 2022-05-16 ENCOUNTER — ORDER TRANSCRIPTION (OUTPATIENT)
Dept: SLEEP CENTER | Age: 42
End: 2022-05-16

## 2022-07-15 RX ORDER — FLUOXETINE HYDROCHLORIDE 20 MG/1
CAPSULE ORAL
Qty: 90 CAPSULE | Refills: 1 | Status: SHIPPED | OUTPATIENT
Start: 2022-07-15 | End: 2023-01-12

## 2022-07-15 NOTE — TELEPHONE ENCOUNTER
Refill passed per [address] protocol.     Requested Prescriptions   Pending Prescriptions Disp Refills    FLUOXETINE 20 MG Oral Cap [Pharmacy Med Name: FLUOXETINE 20MG CAPSULES] 90 capsule 2     Sig: TAKE 1 CAPSULE BY MOUTH DAILY AS TOLERATED        Psychiatric Non-Scheduled (Anti-Anxiety) Passed - 7/15/2022  5:44 AM        Passed - In person appointment or virtual visit in the past 6 mos or appointment in next 3 mos       Recent Outpatient Visits              2 months ago Daytime somnolence    [facility], Andrei Bui, [address] [place] [address]    10 months ago Encounter for routine adult health examination without abnormal findings    [address], [place]    Office Visit    1 year ago [address] [facility], [place]    Telemedicine    2 years ago Encounter for routine adult health examination with abnormal findings    [facility] [facility], [facility], [address] [place] [address] [place] Visit    2 years ago Acute pain of right shoulder    [facility] [facility], [facility] Gab Araiza DO    Office Visit     Future Appointments         Provider Department Appt Notes    In 1 month [address] test auth# [id number] from 5/20/22-7/18/22 [facility] CANCELL[facility]  covid test scheduled                   Future Appointments         Provider Department Appt Notes    In 1 month [facility] [address] test auth# [id number] from 5/20/22-7/18/22 [facility]  covid test scheduled          Recent Outpatient Visits              2 months ago Daytime somnolence    [facility],[address] [facility], Andrei Bui, [place]    Office Visit    10 months ago Encounter for routine adult health examination without abnormal findings    [address] [place], [place]    Office Visit    1 year ago [address] [facility], [place]    Telemedicine    2 years ago Encounter for routine adult health examination with abnormal findings    [address], [place]    Office Visit    2 years ago Acute pain of right shoulder    Deborah Ivey, [place]    Office Visit

## 2022-07-21 RX ORDER — LORAZEPAM 0.5 MG/1
0.5 TABLET ORAL
Qty: 30 TABLET | Refills: 0 | Status: SHIPPED | OUTPATIENT
Start: 2022-07-21

## 2022-08-19 ENCOUNTER — ORDER TRANSCRIPTION (OUTPATIENT)
Dept: SLEEP CENTER | Age: 42
End: 2022-08-19

## 2022-08-19 DIAGNOSIS — G47.10 HYPERSOMNOLENCE: Primary | ICD-10-CM

## 2022-08-19 DIAGNOSIS — R40.0 DAYTIME SOMNOLENCE: ICD-10-CM

## 2022-08-29 ENCOUNTER — MED REC SCAN ONLY (OUTPATIENT)
Dept: FAMILY MEDICINE CLINIC | Facility: CLINIC | Age: 42
End: 2022-08-29

## 2022-09-16 ENCOUNTER — OFFICE VISIT (OUTPATIENT)
Dept: OBGYN CLINIC | Facility: CLINIC | Age: 42
End: 2022-09-16
Payer: COMMERCIAL

## 2022-09-16 VITALS
WEIGHT: 125 LBS | SYSTOLIC BLOOD PRESSURE: 108 MMHG | DIASTOLIC BLOOD PRESSURE: 62 MMHG | HEIGHT: 65 IN | BODY MASS INDEX: 20.83 KG/M2

## 2022-09-16 DIAGNOSIS — Z12.4 ROUTINE CERVICAL SMEAR: Primary | ICD-10-CM

## 2022-09-16 DIAGNOSIS — Z01.419 WOMEN'S ANNUAL ROUTINE GYNECOLOGICAL EXAMINATION: ICD-10-CM

## 2022-09-16 DIAGNOSIS — R92.2 DENSE BREAST TISSUE ON MAMMOGRAM: ICD-10-CM

## 2022-09-16 DIAGNOSIS — Z12.31 BREAST CANCER SCREENING BY MAMMOGRAM: ICD-10-CM

## 2022-09-16 PROCEDURE — 99386 PREV VISIT NEW AGE 40-64: CPT | Performed by: OBSTETRICS & GYNECOLOGY

## 2022-09-16 PROCEDURE — 3008F BODY MASS INDEX DOCD: CPT | Performed by: OBSTETRICS & GYNECOLOGY

## 2022-09-16 PROCEDURE — 3078F DIAST BP <80 MM HG: CPT | Performed by: OBSTETRICS & GYNECOLOGY

## 2022-09-16 PROCEDURE — 3074F SYST BP LT 130 MM HG: CPT | Performed by: OBSTETRICS & GYNECOLOGY

## 2022-09-16 PROCEDURE — 87624 HPV HI-RISK TYP POOLED RSLT: CPT | Performed by: OBSTETRICS & GYNECOLOGY

## 2022-09-19 LAB — HPV I/H RISK 1 DNA SPEC QL NAA+PROBE: NEGATIVE

## 2022-09-28 LAB — LAST PAP RESULT: NORMAL

## 2022-10-13 DIAGNOSIS — E03.9 HYPOTHYROIDISM (ACQUIRED): ICD-10-CM

## 2022-10-13 RX ORDER — LEVOTHYROXINE SODIUM 137 UG/1
137 TABLET ORAL
Qty: 90 TABLET | Refills: 1 | Status: SHIPPED | OUTPATIENT
Start: 2022-10-13

## 2022-10-13 NOTE — TELEPHONE ENCOUNTER
Refill passed per [facility] protocol   Requested Prescriptions   Pending Prescriptions Disp Refills    LEVOTHYROXINE 137 MCG Oral Tab [Pharmacy Med Name: LEVOTHYROXINE 0.137MG (137MCG) TAB] 90 tablet 1     Sig: TAKE 1 TABLET BY MOUTH BEFORE BREAKFAST        Thyroid Medication Protocol Passed - 10/13/2022  5:45 AM        Passed - TSH in past 12 months        Passed - Last TSH value is normal     Lab Results   Component Value Date    TSH 1.550 05/11/2022                 Passed - In person appointment or virtual visit in the past 12 mos or appointment in next 3 mos       Recent Outpatient Visits              3 weeks ago [facility]    Office Visit    5 months ago Daytime somnolence    Jesus Fong, [facility] [facility] [place]    Office Visit    1 year ago Encounter for routine adult health examination without abnormal findings    [address] [place]    Office Visit    1 year ago [address],     Telemedicine    2 years ago Encounter for routine adult health examination with abnormal findings    [address] [place]    Office Visit     Future Appointments         Provider Department Appt Notes    In 2 weeks [facility] [address]

## 2023-01-01 NOTE — PROGRESS NOTES
HPI:   Jeff Hoff is a 45year old female who presents for a complete physical exam.     Trying to exercise more with history of anxiety.    Has had some intermittent chest pressure that comes and goes and is not associated with exercise or exerti CABG   • Skin cancer Father    • Asthma Sister       Social History:   Social History    Tobacco Use      Smoking status: Never Smoker      Smokeless tobacco: Never Used    Alcohol use:  Yes      Alcohol/week: 3.0 oz      Types: 5 Glasses of wine per week Infant s/p gelx2. Prefeed @ 2108 42. Repeat @ 2109 42. except for mildly low voltage. Will check chest x-ray to rule-out pulmonary etiology and she is to notify me if any worsening or new symptoms develop. Check TSH given history of hypothyroidism and increased dose of Levothyroxine during pregnancy.   Referra

## 2023-01-12 RX ORDER — FLUOXETINE HYDROCHLORIDE 20 MG/1
CAPSULE ORAL
Qty: 90 CAPSULE | Refills: 1 | Status: SHIPPED | OUTPATIENT
Start: 2023-01-12

## 2023-01-12 NOTE — TELEPHONE ENCOUNTER
Refill passed per [place] [employer] [facility], [facility] protocol.      Requested Prescriptions   Pending Prescriptions Disp Refills    FLUOXETINE 20 MG Oral Cap [Pharmacy Med Name: FLUOXETINE 20MG CAPSULES] 90 capsule 1     Sig: TAKE 1 CAPSULE BY MOUTH DAILY AS TOLERATED       Psychiatric Non-Scheduled (Anti-Anxiety) Passed - 1/11/2023  5:45 AM        Passed - In person appointment or virtual visit in the past 6 mos or appointment in next 3 mos     Recent Outpatient Visits              3 months ago Routine cervical smear    [facility] [place] 183 Heavenly Brandon MD    Office Visit    8 months ago Daytime somnolence    [facility], [facility], [facility] Visit    1 year ago Encounter for routine adult health examination without abnormal findings    [address], [place]    Office Visit    2 years ago [address], [address] [place] [address], [address] [place] [address]    2 years ago Encounter for routine adult health examination with abnormal findings    [address], [place]    Office Visit          Future Appointments         Provider Department Appt Notes    In 4 days [address] [place] [address]                      Recent Outpatient Visits              3 months ago Routine cervical smear    [address] [place] Vinayak Piña MD    Office Visit    8 months ago Daytime somnolence    [facility], [address] [place] [address], [place]    Office Visit    1 year ago Encounter for routine adult health examination without abnormal findings    [facility], Pedro Luis Bui, [place]    Office Visit    2 years ago [address], [address], [place] Bui, [address] [facility]    2 years ago Encounter for routine adult health examination with abnormal findings    [address], [place]    Office Visit             Future Appointments         Provider Department Appt Notes    In 4 days [facility]

## 2023-01-16 ENCOUNTER — HOSPITAL ENCOUNTER (OUTPATIENT)
Dept: MAMMOGRAPHY | Age: 43
Discharge: HOME | End: 2023-01-16
Attending: OBSTETRICS & GYNECOLOGY
Payer: COMMERCIAL

## 2023-01-16 DIAGNOSIS — Z12.31 BREAST CANCER SCREENING BY MAMMOGRAM: ICD-10-CM

## 2023-01-16 DIAGNOSIS — R92.2 DENSE BREAST TISSUE ON MAMMOGRAM: ICD-10-CM

## 2023-01-16 PROCEDURE — 77063 BREAST TOMOSYNTHESIS BI: CPT | Performed by: OBSTETRICS & GYNECOLOGY

## 2023-01-16 PROCEDURE — 77067 SCR MAMMO BI INCL CAD: CPT | Performed by: OBSTETRICS & GYNECOLOGY

## 2023-04-11 DIAGNOSIS — E03.9 HYPOTHYROIDISM (ACQUIRED): ICD-10-CM

## 2023-04-11 RX ORDER — LEVOTHYROXINE SODIUM 137 UG/1
137 TABLET ORAL
Qty: 90 TABLET | Refills: 1 | Status: SHIPPED | OUTPATIENT
Start: 2023-04-11

## 2023-04-11 NOTE — TELEPHONE ENCOUNTER
Refill passed per [facility] protocol.    Requested Prescriptions   Pending Prescriptions Disp Refills    LEVOTHYROXINE 137 MCG Oral Tab [Pharmacy Med Name: LEVOTHYROXINE 0.137MG (137MCG) TAB] 90 tablet 1     Sig: TAKE 1 TABLET BY MOUTH BEFORE BREAKFAST       Thyroid Medication Protocol Passed - 4/11/2023  5:44 AM        Passed - TSH in past 12 months        Passed - Last TSH value is normal     Lab Results   Component Value Date    TSH 1.550 05/11/2022                 Passed - In person appointment or virtual visit in the past 12 mos or appointment in next 3 mos     Recent Outpatient Visits              6 months ago Routine cervical smear    [facility] Allen MD    Office Visit    11 months ago Daytime somnolence    [facility], [address], [address] [facility], [facility]    1 year ago Encounter for routine adult health examination without abnormal findings    [facility] Miguel Bui, [place]    Office Visit    2 years ago [address] [place] [address], Miguel Bui, [address] [place] [address]    2 years ago Encounter for routine adult health examination with abnormal findings    [address] [facility], [address] [place] [address]    Office Visit                          Recent Outpatient Visits              6 months ago Routine cervical smear    [address], [address], [address] Allen MD    Office Visit    11 months ago Daytime somnolence    [address] [facility], [facility], [facility] Visit    1 year ago Encounter for routine adult health examination without abnormal findings    [address], [address], Miguel Bui, [place]    Office Visit    2 years ago [address], Miguel Bui, [place] Telemedicine    2 years ago Encounter for routine adult health examination with abnormal findings    [facility], [address] [place], [place]    Office Visit

## 2023-06-22 ENCOUNTER — LAB ENCOUNTER (OUTPATIENT)
Dept: LAB | Facility: REFERENCE LAB | Age: 43
End: 2023-06-22
Attending: FAMILY MEDICINE
Payer: COMMERCIAL

## 2023-06-22 DIAGNOSIS — Z00.00 ENCOUNTER FOR ROUTINE ADULT HEALTH EXAMINATION WITHOUT ABNORMAL FINDINGS: ICD-10-CM

## 2023-06-22 LAB
EST. AVERAGE GLUCOSE BLD GHB EST-MCNC: 82 MG/DL (ref 68–126)
HBA1C MFR BLD: 4.5 % (ref ?–5.7)

## 2023-06-22 PROCEDURE — 84439 ASSAY OF FREE THYROXINE: CPT | Performed by: FAMILY MEDICINE

## 2023-06-22 PROCEDURE — 80050 GENERAL HEALTH PANEL: CPT | Performed by: FAMILY MEDICINE

## 2023-06-22 PROCEDURE — 82306 VITAMIN D 25 HYDROXY: CPT | Performed by: FAMILY MEDICINE

## 2023-06-22 PROCEDURE — 84481 FREE ASSAY (FT-3): CPT | Performed by: FAMILY MEDICINE

## 2023-06-22 PROCEDURE — 83036 HEMOGLOBIN GLYCOSYLATED A1C: CPT | Performed by: FAMILY MEDICINE

## 2023-06-22 PROCEDURE — 80061 LIPID PANEL: CPT | Performed by: FAMILY MEDICINE

## 2023-06-23 DIAGNOSIS — R74.8 ELEVATED LIVER ENZYMES: ICD-10-CM

## 2023-06-23 DIAGNOSIS — E03.9 HYPOTHYROIDISM (ACQUIRED): Primary | ICD-10-CM

## 2023-06-25 NOTE — TELEPHONE ENCOUNTER
Please review; protocol failed. Requested Prescriptions   Pending Prescriptions Disp Refills    LORazepam 0.5 MG Oral Tab 30 tablet 0     Sig: Take 1 tablet (0.5 mg total) by mouth daily as needed.        There is no refill protocol information for this order          Future Appointments         Provider Department Appt Notes    In 2 months Vince Gil DO [address], [facility] 86, [facility] check in            Recent Outpatient Visits              9 months ago Routine cervical smear    [address], [facility] 86, [address] Rosado MD    Office Visit    1 year ago Daytime somnolence    [facility], [facility], [address] [place], [place]    Office Visit    1 year ago Encounter for routine adult health examination without abnormal findings    [address] [facility], [address] [place], [place]    Office Visit    2 years ago [facility], [facility], [address] [facility]    2 years ago Encounter for routine adult health examination with abnormal findings    [address], [facility] 86, [address] [place], [place]    Office Visit

## 2023-06-26 RX ORDER — LORAZEPAM 0.5 MG/1
0.5 TABLET ORAL
Qty: 30 TABLET | Refills: 0 | Status: SHIPPED | OUTPATIENT
Start: 2023-06-26

## 2023-07-10 RX ORDER — FLUOXETINE HYDROCHLORIDE 20 MG/1
CAPSULE ORAL
Qty: 90 CAPSULE | Refills: 3 | Status: SHIPPED | OUTPATIENT
Start: 2023-07-10

## 2023-07-11 NOTE — TELEPHONE ENCOUNTER
Refill passed per [place] [facility], [facility] protocol.     .  Requested Prescriptions   Pending Prescriptions Disp Refills    FLUOXETINE 20 MG Oral Cap [Pharmacy Med Name: FLUOXETINE 20MG CAPSULES] 90 capsule 1     Sig: TAKE 1 CAPSULE BY MOUTH DAILY AS TOLERATED       Psychiatric Non-Scheduled (Anti-Anxiety) Passed - 7/10/2023  5:57 AM        Passed - In person appointment or virtual visit in the past 6 mos or appointment in next 3 mos     Recent Outpatient Visits              9 months ago Routine cervical smear    [facility], [address], [address] L[address] Naidu MD    Office Visit    1 year ago Daytime somnolence    [facility], [address], Jignesh Bui, [address] Visit    1 year ago Encounter for routine adult health examination without abnormal findings    [address], [address], Jignesh Bui [place], [place]    Office Visit    2 years ago [address] [place] [address], [place], [address] [place] [address]    3 years ago Encounter for routine adult health examination with abnormal findings    Araceli Kearney, [place], [place]    Office Visit          Future Appointments         Provider Department Appt Notes    In 1 month [address], [facility]    In 2 months Jignesh Wilcox, [address], [address], Selvin Kast check in                    Recent Outpatient Visits              9 months ago Routine cervical smear    [address], [address], Melvi Fox MD    Office Visit    1 year ago Daytime somnolence    [facility], [address], Jignesh Bui, [address] [place] [address]    1 year ago Encounter for routine adult health examination without abnormal findings    [address], [address], Jignesh Buia, [address] Visit 2 years ago [address] [place] [address], [address] [place] [address]    Telemedicine    3 years ago Encounter for routine adult health examination with abnormal findings    Camila Duke [address] [place] [address]    Office Visit            Future Appointments         Provider Department Appt Notes    In 1 month [address], 700 [facility]    In 2 months Shravan Wilcox [address], Selvin [facility] check in

## 2023-08-03 RX ORDER — LORAZEPAM 0.5 MG/1
0.5 TABLET ORAL
Qty: 30 TABLET | Refills: 0 | Status: SHIPPED | OUTPATIENT
Start: 2023-08-03

## 2023-08-03 RX ORDER — LEVOTHYROXINE SODIUM 0.12 MG/1
125 TABLET ORAL
Qty: 60 TABLET | Refills: 0 | Status: SHIPPED | OUTPATIENT
Start: 2023-08-03

## 2023-08-03 NOTE — TELEPHONE ENCOUNTER
Please review; protocol failed.    Requested Prescriptions   Pending Prescriptions Disp Refills    LEVOTHYROXINE 125 MCG Oral Tab [Pharmacy Med Name: LEVOTHYROXINE 0.125MG (125MCG) TAB] 90 tablet 0     Sig: TAKE 1 TABLET(125 MCG) BY MOUTH BEFORE BREAKFAST       Thyroid Medication Protocol Failed - 8/2/2023 11:32 AM        Failed - Last TSH value is normal     Lab Results   Component Value Date    TSH 0.015 (L) 06/22/2023                 Passed - TSH in past 12 months        Passed - In person appointment or virtual visit in the past 12 mos or appointment in next 3 mos     Recent Outpatient Visits              10 months ago Routine cervical smear    [facility], [address] Soto MD    Office Visit    1 year ago Daytime somnolence    [facility], [address], [facility], [facility] Visit    1 year ago Encounter for routine adult health examination without abnormal findings    [address], [facility] [place], [place]    Office Visit    2 years ago [address] [facility], [facility], [address] [place] [address]    3 years ago Encounter for routine adult health examination with abnormal findings    [address] [place], [place]    Office Visit          Future Appointments         Provider Department Appt Notes    In 1 week [address] [place] [facility] egan    [facility] [address], [facility], [address], [facility], Selvin Yext check in                 LORAZEPAM 0.5 MG Oral Tab [Pharmacy Med Name: LORAZEPAM 0.5MG TABLETS] 30 tablet 0     Sig: TAKE 1 TABLET(0.5 MG) BY MOUTH DAILY AS NEEDED       There is no refill protocol information for this order        Recent Outpatient Visits              10 months ago Routine cervical smear    [facility], [address] [place] [address] Kezia Carr MD    Office Visit    1 year ago Daytime somnolence    [facility], [address], [address] [place], [place]    Office Visit    1 year ago Encounter for routine adult health examination without abnormal findings    [address] [facility], [address] [place], [place]    Office Visit    2 years ago [facility], [address] [facility], [address] [place] [address]    3 years ago Encounter for routine adult health examination with abnormal findings    [address] [facility], [address] [place], [place]    Office Visit          Future Appointments         Provider Department Appt Notes    In 1 week [address], [facility]    In Stephan Gut[address], Lavdaniel Ohugh, [address] [facility], Selvin [employer] check in

## 2023-08-03 NOTE — TELEPHONE ENCOUNTER
Left a detailed message (per BETO) to have repeat labs done. Advised to call back with any questions. MyChart message sent.

## 2023-09-15 ENCOUNTER — OFFICE VISIT (OUTPATIENT)
Facility: CLINIC | Age: 43
End: 2023-09-15
Payer: COMMERCIAL

## 2023-09-15 ENCOUNTER — LAB ENCOUNTER (OUTPATIENT)
Dept: LAB | Facility: REFERENCE LAB | Age: 43
End: 2023-09-15
Attending: FAMILY MEDICINE
Payer: COMMERCIAL

## 2023-09-15 VITALS
HEIGHT: 65 IN | OXYGEN SATURATION: 98 % | WEIGHT: 128.81 LBS | SYSTOLIC BLOOD PRESSURE: 122 MMHG | HEART RATE: 72 BPM | DIASTOLIC BLOOD PRESSURE: 70 MMHG | TEMPERATURE: 99 F | BODY MASS INDEX: 21.46 KG/M2

## 2023-09-15 DIAGNOSIS — Z12.31 VISIT FOR SCREENING MAMMOGRAM: ICD-10-CM

## 2023-09-15 DIAGNOSIS — F41.9 ANXIETY: ICD-10-CM

## 2023-09-15 DIAGNOSIS — Z00.00 ENCOUNTER FOR ROUTINE ADULT HEALTH EXAMINATION WITHOUT ABNORMAL FINDINGS: Primary | ICD-10-CM

## 2023-09-15 DIAGNOSIS — E03.9 HYPOTHYROIDISM (ACQUIRED): ICD-10-CM

## 2023-09-15 DIAGNOSIS — R92.2 DENSE BREAST TISSUE ON MAMMOGRAM: ICD-10-CM

## 2023-09-15 DIAGNOSIS — R74.8 ELEVATED LIVER ENZYMES: ICD-10-CM

## 2023-09-15 LAB
ALBUMIN SERPL-MCNC: 4.3 G/DL (ref 3.4–5)
ALBUMIN/GLOB SERPL: 1.4 {RATIO} (ref 1–2)
ALP LIVER SERPL-CCNC: 79 U/L
ALT SERPL-CCNC: 48 U/L
ANION GAP SERPL CALC-SCNC: 6 MMOL/L (ref 0–18)
AST SERPL-CCNC: 35 U/L (ref 15–37)
BILIRUB SERPL-MCNC: 0.5 MG/DL (ref 0.1–2)
BUN BLD-MCNC: 8 MG/DL (ref 7–18)
BUN/CREAT SERPL: 11.4 (ref 10–20)
CALCIUM BLD-MCNC: 9.4 MG/DL (ref 8.5–10.1)
CHLORIDE SERPL-SCNC: 107 MMOL/L (ref 98–112)
CO2 SERPL-SCNC: 28 MMOL/L (ref 21–32)
CREAT BLD-MCNC: 0.7 MG/DL
EGFRCR SERPLBLD CKD-EPI 2021: 110 ML/MIN/1.73M2 (ref 60–?)
FASTING STATUS PATIENT QL REPORTED: NO
GLOBULIN PLAS-MCNC: 3 G/DL (ref 2.8–4.4)
GLUCOSE BLD-MCNC: 75 MG/DL (ref 70–99)
OSMOLALITY SERPL CALC.SUM OF ELEC: 289 MOSM/KG (ref 275–295)
POTASSIUM SERPL-SCNC: 4.3 MMOL/L (ref 3.5–5.1)
PROT SERPL-MCNC: 7.3 G/DL (ref 6.4–8.2)
SODIUM SERPL-SCNC: 141 MMOL/L (ref 136–145)
T3FREE SERPL-MCNC: 2.75 PG/ML (ref 2.4–4.2)
T4 FREE SERPL-MCNC: 1.3 NG/DL (ref 0.8–1.7)
TSI SER-ACNC: 0.04 MIU/ML (ref 0.36–3.74)

## 2023-09-15 PROCEDURE — 84481 FREE ASSAY (FT-3): CPT | Performed by: FAMILY MEDICINE

## 2023-09-15 PROCEDURE — 3008F BODY MASS INDEX DOCD: CPT | Performed by: FAMILY MEDICINE

## 2023-09-15 PROCEDURE — 80053 COMPREHEN METABOLIC PANEL: CPT | Performed by: FAMILY MEDICINE

## 2023-09-15 PROCEDURE — 3074F SYST BP LT 130 MM HG: CPT | Performed by: FAMILY MEDICINE

## 2023-09-15 PROCEDURE — 99213 OFFICE O/P EST LOW 20 MIN: CPT | Performed by: FAMILY MEDICINE

## 2023-09-15 PROCEDURE — 90686 IIV4 VACC NO PRSV 0.5 ML IM: CPT | Performed by: FAMILY MEDICINE

## 2023-09-15 PROCEDURE — 84443 ASSAY THYROID STIM HORMONE: CPT | Performed by: FAMILY MEDICINE

## 2023-09-15 PROCEDURE — 90471 IMMUNIZATION ADMIN: CPT | Performed by: FAMILY MEDICINE

## 2023-09-15 PROCEDURE — 3078F DIAST BP <80 MM HG: CPT | Performed by: FAMILY MEDICINE

## 2023-09-15 PROCEDURE — 84439 ASSAY OF FREE THYROXINE: CPT | Performed by: FAMILY MEDICINE

## 2023-09-15 PROCEDURE — 99396 PREV VISIT EST AGE 40-64: CPT | Performed by: FAMILY MEDICINE

## 2023-09-15 RX ORDER — TOPIRAMATE 25 MG/1
25 TABLET ORAL
Qty: 90 TABLET | Refills: 0 | Status: SHIPPED | OUTPATIENT
Start: 2023-09-15

## 2023-09-17 DIAGNOSIS — E03.9 HYPOTHYROIDISM (ACQUIRED): Primary | ICD-10-CM

## 2023-09-17 RX ORDER — LEVOTHYROXINE SODIUM 0.1 MG/1
100 TABLET ORAL DAILY
Qty: 90 TABLET | Refills: 0 | Status: SHIPPED | OUTPATIENT
Start: 2023-09-17

## 2023-10-24 ENCOUNTER — LAB ENCOUNTER (OUTPATIENT)
Dept: LAB | Age: 43
End: 2023-10-24
Attending: FAMILY MEDICINE

## 2023-10-24 DIAGNOSIS — E03.9 HYPOTHYROIDISM (ACQUIRED): ICD-10-CM

## 2023-10-24 LAB
T3FREE SERPL-MCNC: 2.21 PG/ML (ref 2.4–4.2)
T4 FREE SERPL-MCNC: 1.1 NG/DL (ref 0.8–1.7)
TSI SER-ACNC: 0.32 MIU/ML (ref 0.36–3.74)

## 2023-10-24 PROCEDURE — 84439 ASSAY OF FREE THYROXINE: CPT | Performed by: FAMILY MEDICINE

## 2023-10-24 PROCEDURE — 84443 ASSAY THYROID STIM HORMONE: CPT | Performed by: FAMILY MEDICINE

## 2023-10-24 PROCEDURE — 84481 FREE ASSAY (FT-3): CPT | Performed by: FAMILY MEDICINE

## 2023-11-08 ENCOUNTER — PATIENT MESSAGE (OUTPATIENT)
Facility: CLINIC | Age: 43
End: 2023-11-08

## 2023-11-13 ENCOUNTER — TELEPHONE (OUTPATIENT)
Facility: CLINIC | Age: 43
End: 2023-11-13

## 2023-11-13 NOTE — TELEPHONE ENCOUNTER
Verified name and . Patient states she has been dealing with hemorrhoids for the past 4 and half years since she gave birth to her second child. She states that they occasionally flare up with swelling, bleeding, and pain. She also states that her dermatologist advised her that she may have an umbilical hernia- she denies any symptoms related to this.     She is requesting appointment with Dr. Ewa Coffey to discuss if she may need to see a specialist.    Appointment scheduled:  Future Appointments   Date Time Provider Andrae Medley   2023  9:00 AM DO CELIA [address]G 14 FP EMMG 10 OP   2024  2:00 PM [facility] [address]   2024  9:30 AM DO CELIA ReidG 14 FP E[facility] 10 OP

## 2023-11-20 ENCOUNTER — OFFICE VISIT (OUTPATIENT)
Facility: CLINIC | Age: 43
End: 2023-11-20
Payer: COMMERCIAL

## 2023-11-20 VITALS
HEART RATE: 64 BPM | DIASTOLIC BLOOD PRESSURE: 74 MMHG | HEIGHT: 65 IN | BODY MASS INDEX: 21.49 KG/M2 | WEIGHT: 129 LBS | SYSTOLIC BLOOD PRESSURE: 118 MMHG | OXYGEN SATURATION: 97 %

## 2023-11-20 DIAGNOSIS — E03.9 HYPOTHYROIDISM (ACQUIRED): ICD-10-CM

## 2023-11-20 DIAGNOSIS — K64.4 EXTERNAL HEMORRHOID, BLEEDING: Primary | ICD-10-CM

## 2023-11-20 PROCEDURE — 3008F BODY MASS INDEX DOCD: CPT | Performed by: FAMILY MEDICINE

## 2023-11-20 PROCEDURE — 99213 OFFICE O/P EST LOW 20 MIN: CPT | Performed by: FAMILY MEDICINE

## 2023-11-20 PROCEDURE — 3074F SYST BP LT 130 MM HG: CPT | Performed by: FAMILY MEDICINE

## 2023-11-20 PROCEDURE — 3078F DIAST BP <80 MM HG: CPT | Performed by: FAMILY MEDICINE

## 2023-11-20 RX ORDER — HYDROCORTISONE 25 MG/G
1 CREAM TOPICAL 2 TIMES DAILY
Qty: 28 G | Refills: 0 | Status: SHIPPED | OUTPATIENT
Start: 2023-11-20

## 2023-12-20 ENCOUNTER — NURSE TRIAGE (OUTPATIENT)
Facility: CLINIC | Age: 43
End: 2023-12-20

## 2023-12-20 ENCOUNTER — TELEMEDICINE (OUTPATIENT)
Dept: FAMILY MEDICINE CLINIC | Facility: CLINIC | Age: 43
End: 2023-12-20
Payer: COMMERCIAL

## 2023-12-20 DIAGNOSIS — R05.1 ACUTE COUGH: Primary | ICD-10-CM

## 2023-12-20 DIAGNOSIS — R09.81 NASAL CONGESTION: ICD-10-CM

## 2023-12-20 DIAGNOSIS — R50.9 FEVER, UNSPECIFIED FEVER CAUSE: ICD-10-CM

## 2023-12-20 PROCEDURE — 99213 OFFICE O/P EST LOW 20 MIN: CPT

## 2023-12-20 RX ORDER — CODEINE PHOSPHATE AND GUAIFENESIN 10; 100 MG/5ML; MG/5ML
5 SOLUTION ORAL NIGHTLY PRN
Qty: 118 ML | Refills: 0 | Status: SHIPPED | OUTPATIENT
Start: 2023-12-20

## 2023-12-20 RX ORDER — BENZONATATE 200 MG/1
200 CAPSULE ORAL 3 TIMES DAILY PRN
Qty: 90 CAPSULE | Refills: 0 | Status: SHIPPED | OUTPATIENT
Start: 2023-12-20

## 2023-12-20 RX ORDER — FLUTICASONE PROPIONATE 50 MCG
2 SPRAY, SUSPENSION (ML) NASAL DAILY
Qty: 16 G | Refills: 0 | Status: SHIPPED | OUTPATIENT
Start: 2023-12-20 | End: 2023-12-21

## 2023-12-20 NOTE — PROGRESS NOTES
Virtual Virtual Check-In    Navi Layton verbally consents to a Virtual Video Check-In service on 12/20/23. Patient understands and accepts financial responsibility for any deductible, co-insurance and/or co-pays associated with this service. This visit is conducted using Telemedicine with live, interactive video and audio. I spoke with Damian Velasquez by secure video chat, verified date of birth, and discussed their current concerns:   Duration: 15 mins     HPI  Damian Velasquez presented via video visit for complaints of sore throat x 4 days. Associated cough, fever (103 F), nasal congestion, fatigue. Denies chest pain, shortness of breath, headache, changes in vision. Home COVID negative. Taking motirin and Nyquil with minimal improvement. Review of Systems:   Review of Systems   Constitutional:  Positive for fatigue and fever. Negative for activity change. HENT:  Positive for congestion, ear pain, postnasal drip, rhinorrhea and sore throat. Negative for sinus pressure and sinus pain. Respiratory:  Positive for cough. Negative for chest tightness and shortness of breath. Cardiovascular:  Negative for chest pain and palpitations. Gastrointestinal:  Positive for diarrhea. Negative for constipation, nausea and vomiting. Neurological: Negative. Psychiatric/Behavioral: Negative. All other systems reviewed and are negative.        Reviewed Active Problems:  Patient Active Problem List    Diagnosis    Anxiety    Hypothyroidism (acquired)      Reviewed Past Medical History:  Past Medical History:   Diagnosis Date    Dense breast tissue on mammogram 08/2021    Thyroid disease       Reviewed Family History:  Family History   Problem Relation Age of Onset    Skin cancer Mother 68        Squamous cell carcinoma     Diabetes Mother     Heart Disease Father         CABG    Skin cancer Father     Asthma Sister        Reviewed Social History:  Social History     Socioeconomic History Marital status:    Tobacco Use    Smoking status: Never    Smokeless tobacco: Never   Vaping Use    Vaping Use: Never used   Substance and Sexual Activity    Alcohol use: Yes     Alcohol/week: 21.0 standard drinks of alcohol     Types: 21 Glasses of wine per week     Comment: 3-4 glasses of wine a day    Drug use: Not Currently     Types: Cannabis     Comment: Edibles     Sexual activity: Yes     Partners: Male     Birth control/protection: Vasectomy      Reviewed Current Medications:  Current Outpatient Medications   Medication Sig Dispense Refill    hydrocortisone (ANUSOL-HC) 2.5 % External Cream Place 1 Application rectally 2 (two) times daily. 28 g 0    levothyroxine 100 MCG Oral Tab Take 1 tablet (100 mcg total) by mouth daily. 90 tablet 0    LORazepam 0.5 MG Oral Tab Take 1 tablet (0.5 mg total) by mouth daily as needed. 30 tablet 0    FLUoxetine 20 MG Oral Cap TAKE 1 CAPSULE BY MOUTH DAILY AS TOLERATED 90 capsule 3          Physical Exam  There were no vitals filed for this visit. Physical Exam  Vitals reviewed. Constitutional:       General: She is not in acute distress. Appearance: Normal appearance. She is ill-appearing. HENT:      Head: Normocephalic and atraumatic. Pulmonary:      Effort: Pulmonary effort is normal.   Neurological:      General: No focal deficit present. Mental Status: She is alert and oriented to person, place, and time. Psychiatric:         Mood and Affect: Mood normal.         Behavior: Behavior normal.            Diagnosis:  (R05.1) Acute cough  (primary encounter diagnosis)  (R50.9) Fever, unspecified fever cause  (R09.81) Nasal congestion  Plan: fluticasone propionate 50 MCG/ACT Nasal         Suspension  Plan: benzonatate 200 MG Oral Cap,         guaiFENesin-codeine (CHERATUSSIN AC) 100-10         MG/5ML Oral Solution  Patient with flu like symptoms. No chest pain, shortness of breath. HPI anf\d exam consistent with viral URI.  Limited exam due to virtual. Advised Supportive care - humidifier at night, hot steam showers, lozenges, hot/cold beverages, LOTS of fluids, rest. Script for tessalon TID PRN to pharmacy. Script for Cheratussin nightly PRN to pharmacy. Advised will cause drowsiness. Advised if symptoms worsen or do not improve follow up in office for evaluation. Follow up as needed    Please note that the following visit was completed using two-way, real-time interactive audio and/or video communication. This has been done in good jamar to provide continuity of care in the best interest of the provider-patient relationship, due to the ongoing public health crisis/national emergency and because of restrictions of visitation. There are limitations of this visit as no physical exam could be performed. Every conscious effort was taken to allow for sufficient and adequate time. This billing was spent on reviewing labs, medications, radiology tests and decision making. Appropriate medical decision-making and tests are ordered as detailed in the plan of care above. Kam Josephney understands video evaluation is not a substitute for in person examination or emergency care. Patient advised to go to ER or call 911 for worsening symptoms or acute distress. This note was prepared using [employer] [facility] [employer] voice recognition dictation software. As a result errors may occur. When identified these errors have been corrected.  While every attempt is made to correct errors during dictation discrepancies may still exist.  IMANI Agrawal

## 2023-12-20 NOTE — TELEPHONE ENCOUNTER
Action Requested: Summary for Provider     []  Critical Lab, Recommendations Needed  [] Need Additional Advice  [x]   FYI    []   Need Orders  [] Need Medications Sent to Pharmacy  []  Other     SUMMARY:   Spoke with pt,  verified, pt c/o dry cough, sore throat, pt sx started last Monday, yesterday she developed  fever 103.0 (tympanic), today 101.2 , she takes mortin as needed and it bring her fever down to 101. Pt hands are numb, she has shooting pain on her left ear, diarrhea yesterday and today x1  Pt had covid test yesterday and was negative. Pt also takes Nyquil cold and flu but not helping, also takes tea with honey with minimal relief. Pt is able to eat and drink fluid. Pt denies chest pain, shortness of breath,  no other sx. Pt was advised if sx persist or gets worse to go to ER/ IC, she agreed and stated understanding. Virtual appt made as requested, instruction given.          Reason for call: fever  Onset: 2-3 days          Future Appointments   Date Time Provider Andrae Medley   2023  2:00 PM IMANI Colby   2024  2:00 PM [facility] [address]   2024  9:30 AM DO ELY Munoz [address] [place] [address] OP           Reason for Disposition   Patient wants to be seen    Protocols used: Fever-A-OH
none

## 2023-12-21 RX ORDER — FLUTICASONE PROPIONATE 50 MCG
2 SPRAY, SUSPENSION (ML) NASAL DAILY
Qty: 48 G | Refills: 3 | Status: SHIPPED | OUTPATIENT
Start: 2023-12-21

## 2023-12-21 NOTE — TELEPHONE ENCOUNTER
Refill passed per [place] [employer] protocol.   Requested Prescriptions   Pending Prescriptions Disp Refills    FLUTICASONE PROPIONATE 50 MCG/ACT Nasal Suspension [Pharmacy Med Name: FLUTICASONE 50MCG NASAL SP (120) RX] 48 g 0     Sig: SHAKE LIQUID AND USE 2 SPRAYS IN EACH NOSTRIL DAILY       Allergy Medication Protocol Passed - 12/20/2023  2:17 PM        Passed - In person appointment or virtual visit in the past 12 mos or appointment in next 3 mos     Recent Outpatient Visits              Yesterday Acute cough    [address] [facility], [facility], [facility]    Telemedicine    1 month ago External hemorrhoid, bleeding    [address] [facility], Andrei Bui, [address]    3 months ago Encounter for routine adult health examination without abnormal findings    [address] [facility], [address] [place], [place]    Office Visit    1 year ago Routine cervical smear    [address], [address], [address] Loyd Celaya MD    Office Visit    1 year ago Daytime somnolence    [address] [facility], Andrei Bui, [address] [facility]          Future Appointments         Provider Department Appt Notes    In 1 month  [address]     In [facility] [address], Andrei Hadley, [address], [address], [address] [facility] Visits              Yesterday Acute cough    [address] [facility], [facility], APRN    Telemedicine    1 month ago External hemorrhoid, bleeding    [address] [facility], Andrei Bui, [address]    3 months ago Encounter for routine adult health examination without abnormal findings    [address] [facility], [address] [place], [place]    Office Visit    1 year ago Routine cervical smear [facility], [address] C[address] Loyd Lebron MD    Office Visit    1 year ago Daytime somnolence    [facility], [address], [address] [place], [place]    Office Visit          Future Appointments         Provider Department Appt Notes    In 1 month  [address]     In Stephan Gormana [address], [address], [address] [facility], [address] [place] [address]

## 2023-12-29 RX ORDER — LEVOTHYROXINE SODIUM 0.1 MG/1
100 TABLET ORAL DAILY
Qty: 30 TABLET | Refills: 0 | Status: SHIPPED | OUTPATIENT
Start: 2023-12-29

## 2023-12-29 NOTE — TELEPHONE ENCOUNTER
Please review; protocol failed.     Requested Prescriptions   Pending Prescriptions Disp Refills    LEVOTHYROXINE 100 MCG Oral Tab [Pharmacy Med Name: LEVOTHYROXINE 0.100MG (100MCG) TAB] 90 tablet 0     Sig: TAKE 1 TABLET(100 MCG) BY MOUTH DAILY       Thyroid Medication Protocol Failed - 12/28/2023  5:39 PM        Failed - Last TSH value is normal     Lab Results   Component Value Date    TSH 0.315 (L) 10/24/2023                 Passed - TSH in past 12 months        Passed - In person appointment or virtual visit in the past 12 mos or appointment in next 3 mos     Recent Outpatient Visits              1 week ago Acute cough    [address] [facility], [facility] [address] IMANI Nuñez    Telemedicine    1 month ago External hemorrhoid, bleeding    [address] [facility], Alexa Bui, [address] [place] [address]    3 months ago Encounter for routine adult health examination without abnormal findings    [address] [facility], [address] [place], [place]    Office Visit    1 year ago Routine cervical smear    [address], [address], [address] Cours LillianPaul Cancino MD    Office Visit    1 year ago Daytime somnolence    [facility], [address], Alexa Bui, [address] [place] Richy Visit          Future Appointments         Provider Department Appt Notes    In 3 weeks [address] [place] [address] Rd     In [place] [address], Alexa Michelle, [address] [facility], [address] [facility]

## 2023-12-30 RX ORDER — LEVOTHYROXINE SODIUM 0.1 MG/1
100 TABLET ORAL DAILY
Qty: 90 TABLET | Refills: 0 | OUTPATIENT
Start: 2023-12-30

## 2023-12-30 NOTE — TELEPHONE ENCOUNTER
Duplicate request, previously addressed.       Per refill 12/29    Note to Pharmacy:   Needs repeat labs

## 2024-01-22 ENCOUNTER — HOSPITAL ENCOUNTER (OUTPATIENT)
Dept: MAMMOGRAPHY | Age: 44
Discharge: HOME | End: 2024-01-22
Attending: FAMILY MEDICINE
Payer: COMMERCIAL

## 2024-01-22 DIAGNOSIS — Z12.31 VISIT FOR SCREENING MAMMOGRAM: ICD-10-CM

## 2024-01-22 PROCEDURE — 77067 SCR MAMMO BI INCL CAD: CPT | Performed by: FAMILY MEDICINE

## 2024-01-22 PROCEDURE — 77063 BREAST TOMOSYNTHESIS BI: CPT | Performed by: FAMILY MEDICINE

## 2024-02-09 NOTE — TELEPHONE ENCOUNTER
Protocol Failed/ No Protocol    Requested Prescriptions   Pending Prescriptions Disp Refills    LEVOTHYROXINE 100 MCG Oral Tab [Pharmacy Med Name: LEVOTHYROXINE 0.100MG (100MCG) TAB] 30 tablet 0     Sig: TAKE 1 TABLET(100 MCG) BY MOUTH DAILY       Thyroid Medication Protocol Failed - 2/9/2024  7:41 AM        Failed - Last TSH value is normal     Lab Results   Component Value Date    TSH 0.315 (L) 10/24/2023                 Passed - TSH in past 12 months        Passed - In person appointment or virtual visit in the past 12 mos or appointment in next 3 mos     Recent Outpatient Visits              1 month ago Acute cough    [facility], [facility] Libertad Crocker APRN    Telemedicine    2 months ago External hemorrhoid, bleeding    [facility] Karma Burden DO    Office Visit    4 months ago Encounter for routine adult health examination without abnormal findings    [facility] Karma Burden DO    Office Visit    1 year ago Routine cervical smear    [facility] - [facility] Ronda Harris MD    Office Visit    1 year ago Daytime somnolence    [facility] Karma Burden DO    Office Visit          Future Appointments         Provider Department Appt Notes    In 5 days Sharee Hinds MD [facility] [place] [facility], [place] Evaluation                    Future Appointments         Provider Department Appt Notes    In 5 days Sharee Hinds MD [facility] [place] [facility], [facility] Evaluation          Recent Outpatient Visits              1 month ago Acute cough    [facility] Libertad Crocker APRN    Telemedicine    2 months ago External hemorrhoid, bleeding    [facility] Karma Burden DO    Office Visit     4 months ago Encounter for routine adult health examination without abnormal findings    [facility], [facility] Karma Burden,     Office Visit    1 year ago Routine cervical smear    [facility] Ronda Harris MD    Office Visit    1 year ago Daytime somnolence    [facility], [facility] Karma Burden,     Office Visit

## 2024-02-11 RX ORDER — LEVOTHYROXINE SODIUM 0.1 MG/1
100 TABLET ORAL DAILY
Qty: 30 TABLET | Refills: 0 | Status: SHIPPED | OUTPATIENT
Start: 2024-02-11

## 2024-02-13 ENCOUNTER — LAB ENCOUNTER (OUTPATIENT)
Dept: LAB | Age: 44
End: 2024-02-13
Attending: FAMILY MEDICINE
Payer: COMMERCIAL

## 2024-02-13 DIAGNOSIS — E03.9 HYPOTHYROIDISM (ACQUIRED): ICD-10-CM

## 2024-02-13 LAB
T4 FREE SERPL-MCNC: 0.7 NG/DL (ref 0.8–1.7)
TSI SER-ACNC: 6.17 MIU/ML (ref 0.55–4.78)

## 2024-02-13 PROCEDURE — 84443 ASSAY THYROID STIM HORMONE: CPT | Performed by: FAMILY MEDICINE

## 2024-02-13 PROCEDURE — 84439 ASSAY OF FREE THYROXINE: CPT | Performed by: FAMILY MEDICINE

## 2024-02-13 RX ORDER — LEVOTHYROXINE SODIUM 0.1 MG/1
100 TABLET ORAL DAILY
Qty: 90 TABLET | Refills: 0 | OUTPATIENT
Start: 2024-02-13

## 2024-02-14 ENCOUNTER — PATIENT MESSAGE (OUTPATIENT)
Facility: CLINIC | Age: 44
End: 2024-02-14

## 2024-02-14 DIAGNOSIS — E03.9 HYPOTHYROIDISM (ACQUIRED): Primary | ICD-10-CM

## 2024-02-14 NOTE — TELEPHONE ENCOUNTER
Mila Schneider, RN 2/14/2024 1:05 PM CST        ----- Message -----  From: Negar Layton  Sent: 2/14/2024 8:20 AM CST  To: Em Triage Support  Subject: Prescription     Hello, yes I am fine with the increase in Levothyroxine. Thank you.    Gabriela

## 2024-02-15 RX ORDER — LEVOTHYROXINE SODIUM 112 UG/1
112 TABLET ORAL
Qty: 90 TABLET | Refills: 3 | Status: SHIPPED | OUTPATIENT
Start: 2024-02-15

## 2024-05-01 ENCOUNTER — HOSPITAL ENCOUNTER (OUTPATIENT)
Dept: ULTRASOUND IMAGING | Facility: HOSPITAL | Age: 44
Discharge: HOME | End: 2024-05-01
Attending: FAMILY MEDICINE
Payer: COMMERCIAL

## 2024-05-01 ENCOUNTER — OFFICE VISIT (OUTPATIENT)
Dept: SURGERY | Facility: CLINIC | Age: 44
End: 2024-05-01

## 2024-05-01 VITALS — WEIGHT: 129 LBS | HEIGHT: 65 IN | BODY MASS INDEX: 21.49 KG/M2

## 2024-05-01 DIAGNOSIS — R92.30 DENSE BREAST TISSUE ON MAMMOGRAM: ICD-10-CM

## 2024-05-01 DIAGNOSIS — K64.8 HEMORRHOIDS, COMPLICATED: Primary | ICD-10-CM

## 2024-05-01 PROCEDURE — 76641 ULTRASOUND BREAST COMPLETE: CPT | Performed by: FAMILY MEDICINE

## 2024-05-01 PROCEDURE — 99244 OFF/OP CNSLTJ NEW/EST MOD 40: CPT | Performed by: SURGERY

## 2024-05-01 PROCEDURE — 3008F BODY MASS INDEX DOCD: CPT | Performed by: SURGERY

## 2024-05-01 NOTE — H&P
Chief complaint: Hemorrhoids    HPI: Negar presents for bleeding complicated hemorrhoids. She had hemorrhoids during/after her pregnancies, 12 years ago. They are worse now. Bleeding is usually after Bms. She has some prolapse of internal hemorrhoids.     Past medical history:   Past Medical History:    Dense breast tissue on mammogram    Thyroid disease       Past surgical history:   Past Surgical History:   Procedure Laterality Date    Enlarge breast Bilateral 2013    Implantable breast prosthesis  2013       Allergies: No Known Allergies    Medications:   Current Outpatient Medications   Medication Sig Dispense Refill    levothyroxine 112 MCG Oral Tab Take 1 tablet (112 mcg total) by mouth before breakfast. 90 tablet 3    fluticasone propionate 50 MCG/ACT Nasal Suspension 2 sprays by Nasal route daily. 48 g 3    benzonatate 200 MG Oral Cap Take 1 capsule (200 mg total) by mouth 3 (three) times daily as needed. 90 capsule 0    guaiFENesin-codeine (CHERATUSSIN AC) 100-10 MG/5ML Oral Solution Take 5 mL by mouth nightly as needed. 118 mL 0    hydrocortisone (ANUSOL-HC) 2.5 % External Cream Place 1 Application rectally 2 (two) times daily. 28 g 0    LORazepam 0.5 MG Oral Tab Take 1 tablet (0.5 mg total) by mouth daily as needed. 30 tablet 0    FLUoxetine 20 MG Oral Cap TAKE 1 CAPSULE BY MOUTH DAILY AS TOLERATED 90 capsule 3       Social history:   Social History     Socioeconomic History    Marital status:    Tobacco Use    Smoking status: Never    Smokeless tobacco: Never   Vaping Use    Vaping status: Never Used   Substance and Sexual Activity    Alcohol use: Yes     Alcohol/week: 21.0 standard drinks of alcohol     Types: 21 Glasses of wine per week     Comment: 3-4 glasses of wine a day    Drug use: Not Currently     Types: Cannabis     Comment: Edibles     Sexual activity: Yes     Partners: Male     Birth control/protection: Vasectomy        Family history:  Family History   Problem Relation Age of  Onset    Skin cancer Mother 73        Squamous cell carcinoma     Diabetes Mother     Heart Disease Father         CABG    Skin cancer Father     Asthma Sister     Breast Cancer Neg     Ovarian Cancer Neg         Review of Systems:   GENERAL: feels generally well  SKIN: no ulcerated or worrisome skin lesions  EYES:denies blurred vision or double vision  HEENT: denies new nasal congestion, sinus pain or ST  LUNGS: denies shortness of breath with exertion  CARDIOVASCULAR: denies chest pain on exertion  GI: no hematemesis, no BRBPR, no worsening heartburn  : no dysuria, no blood in urine, no difficulty urinating  MUSCULOSKELETAL: no new musculoskeletal complaints  NEURO: no persistent, recurrent  headaches  PSYCHE:no depression or anxiety  HEMATOLOGIC: no hx of blood dyscrasia  ENDOCRINE: no new endocrine problems  ALL/ASTHMA: no new hx of severe allergy or asthma  BACK: normal, no spinal deformity, no CVA tenderness    Physical examination:  Alert, NAD  HEENT wnl, anicteric, PERRL  Neck supple, norm ROM, no JVD  L Non-labored resp  H Reg rate  Abd soft, NT, ND, no masses, no hernias, no HSM.  Rectal: normal tone, heme negative stool, small uncomplicated left and anterior external hemorrhoids. Internal engorged, prolapsing  hemorrhoids, no fistula seen on anoscopy  Extr no c/c/e  Skin intact, no jaundice, no rashes, no lesions  Neuro grossly intact, no focal deficits, no tremors  Back no deformity, no CVA tnd.  iate for age          Assessment and plan:  Diagnoses and all orders for this visit:    Hemorrhoids, complicated       Plan band ligation. May need staged procedure or surgery eventuall depending on clinical status. Discussed keeping stool soft. We have discussed the surgical risks, benefits, alternatives, and expected recovery. All of the patient's questions have been answered to her satisfaction.      Sharee Hinds MD  5/1/2024  11:46 AM

## 2024-08-26 ENCOUNTER — LAB ENCOUNTER (OUTPATIENT)
Dept: LAB | Age: 44
End: 2024-08-26
Attending: FAMILY MEDICINE
Payer: COMMERCIAL

## 2024-08-26 DIAGNOSIS — E03.9 HYPOTHYROIDISM (ACQUIRED): ICD-10-CM

## 2024-08-26 LAB — TSI SER-ACNC: 1.68 MIU/ML (ref 0.55–4.78)

## 2024-08-26 PROCEDURE — 84443 ASSAY THYROID STIM HORMONE: CPT | Performed by: FAMILY MEDICINE

## 2024-09-03 NOTE — TELEPHONE ENCOUNTER
Refill passed per [facility] protocol.  Requested Prescriptions   Pending Prescriptions Disp Refills    FLUoxetine 20 MG Oral Cap 90 capsule 3     Sig: TAKE 1 CAPSULE BY MOUTH DAILY AS TOLERATED       Psychiatric Non-Scheduled (Anti-Anxiety) Passed - 9/3/2024 11:26 AM        Passed - In person appointment or virtual visit in the past 6 mos or appointment in next 3 mos     Recent Outpatient Visits              4 months ago Hemorrhoids, complicated    [facility] [place]    Office Visit    8 months ago Acute cough    [facility] ObiLibertad doll, APRN    Telemedicine    9 months ago External hemorrhoid, bleeding    [facility] Karma Burden DO    Office Visit    11 months ago Encounter for routine adult health examination without abnormal findings    [facility] Karma Burden DO    Office Visit    1 year ago Routine cervical smear    [facility] - OB/GYN B[place], Ronda YU MD    Office Visit          Future Appointments         Provider Department Appt Notes    In 6 days Sharee Hinds MD [facility] 8/28 1st attempt sent mychart to [email or web address]  Hemorrhoid repair    In 3 months Karma Burden DO [facility] Physical                    Passed - Depression Screening completed within the past 12 months           Future Appointments         Provider Department Appt Notes    In 6 days Sharee Hinds MD [facility] 8/28 1st attempt sent mychart to [email or web address]  Hemorrhoid repair    In 3 months Karma Burden DO [facility] Physical          Recent Outpatient Visits              4 months ago Hemorrhoids, complicated    [place]  [facility] [place]    Office Visit    8 months ago Acute cough    [facility] Libertad Crocker APRN    Telemedicine    9 months ago External hemorrhoid, bleeding    [facility] Karma Burden DO    Office Visit    11 months ago Encounter for routine adult health examination without abnormal findings    [facility] Karma Burden DO    Office Visit    1 year ago Routine cervical smear    [facility] - [facility] Ronda Harris MD    Office Visit

## 2024-09-09 ENCOUNTER — OFFICE VISIT (OUTPATIENT)
Dept: SURGERY | Facility: CLINIC | Age: 44
End: 2024-09-09

## 2024-09-09 VITALS — BODY MASS INDEX: 21.49 KG/M2 | WEIGHT: 129 LBS | HEIGHT: 65 IN

## 2024-09-09 DIAGNOSIS — K64.8 HEMORRHOIDS, COMPLICATED: Primary | ICD-10-CM

## 2024-09-09 PROCEDURE — 99214 OFFICE O/P EST MOD 30 MIN: CPT | Performed by: SURGERY

## 2024-09-09 PROCEDURE — 3008F BODY MASS INDEX DOCD: CPT | Performed by: SURGERY

## 2024-09-09 NOTE — H&P
Chief complaint: Hemorrhoids    HPI: Negar presents for bleeding complicated hemorrhoids. She had hemorrhoids during/after her pregnancies, 12 years ago. They are worse now. Bleeding is usually after Bms. She has some prolapse of internal hemorrhoids.     Past medical history:   Past Medical History:    Dense breast tissue on mammogram    Thyroid disease       Past surgical history:   Past Surgical History:   Procedure Laterality Date    Enlarge breast Bilateral 2013    Implantable breast prosthesis  2013       Allergies: No Known Allergies    Medications:   Current Outpatient Medications   Medication Sig Dispense Refill    FLUoxetine 20 MG Oral Cap TAKE 1 CAPSULE BY MOUTH DAILY AS TOLERATED 90 capsule 3    levothyroxine 112 MCG Oral Tab Take 1 tablet (112 mcg total) by mouth before breakfast. 90 tablet 3    fluticasone propionate 50 MCG/ACT Nasal Suspension 2 sprays by Nasal route daily. 48 g 3    benzonatate 200 MG Oral Cap Take 1 capsule (200 mg total) by mouth 3 (three) times daily as needed. 90 capsule 0    guaiFENesin-codeine (CHERATUSSIN AC) 100-10 MG/5ML Oral Solution Take 5 mL by mouth nightly as needed. 118 mL 0    hydrocortisone (ANUSOL-HC) 2.5 % External Cream Place 1 Application rectally 2 (two) times daily. 28 g 0    LORazepam 0.5 MG Oral Tab Take 1 tablet (0.5 mg total) by mouth daily as needed. 30 tablet 0       Social history:   Social History     Socioeconomic History    Marital status:    Tobacco Use    Smoking status: Never    Smokeless tobacco: Never   Vaping Use    Vaping status: Never Used   Substance and Sexual Activity    Alcohol use: Yes     Alcohol/week: 21.0 standard drinks of alcohol     Types: 21 Glasses of wine per week     Comment: 3-4 glasses of wine a day    Drug use: Not Currently     Types: Cannabis     Comment: Edibles     Sexual activity: Yes     Partners: Male     Birth control/protection: Vasectomy        Family history:  Family History   Problem Relation Age of  Onset    Skin cancer Mother 73        Squamous cell carcinoma     Diabetes Mother     Heart Disease Father         CABG    Skin cancer Father     Asthma Sister     Breast Cancer Neg     Ovarian Cancer Neg         Review of Systems:   GENERAL: feels generally well  SKIN: no ulcerated or worrisome skin lesions  EYES:denies blurred vision or double vision  HEENT: denies new nasal congestion, sinus pain or ST  LUNGS: denies shortness of breath with exertion  CARDIOVASCULAR: denies chest pain on exertion  GI: no hematemesis, no BRBPR, no worsening heartburn  : no dysuria, no blood in urine, no difficulty urinating  MUSCULOSKELETAL: no new musculoskeletal complaints  NEURO: no persistent, recurrent  headaches  PSYCHE:no depression or anxiety  HEMATOLOGIC: no hx of blood dyscrasia  ENDOCRINE: no new endocrine problems  ALL/ASTHMA: no new hx of severe allergy or asthma  BACK: normal, no spinal deformity, no CVA tenderness    Physical examination:  Alert, NAD  HEENT wnl, anicteric, PERRL  Neck supple, norm ROM, no JVD  L Non-labored resp  H Reg rate  Abd soft, NT, ND, no masses, no hernias, no HSM.  Rectal: normal tone, heme negative stool, small uncomplicated left and anterior external hemorrhoids. Internal engorged, prolapsing  hemorrhoids, no fistula seen.  Extr no c/c/e  Skin intact, no jaundice, no rashes, no lesions  Neuro grossly intact, no focal deficits, no tremors  Back no deformity, no CVA tnd.  iate for age          Assessment and plan:  Diagnoses and all orders for this visit:    Hemorrhoids, complicated       Plan band ligation. May need staged procedure or surgery eventuall depending on clinical status. Discussed keeping stool soft. We have discussed the surgical risks, benefits, alternatives, and expected recovery. All of the patient's questions have been answered to her satisfaction.      Sharee Hinds MD

## 2024-09-16 ENCOUNTER — OFFICE VISIT (OUTPATIENT)
Dept: SURGERY | Facility: CLINIC | Age: 44
End: 2024-09-16

## 2024-09-16 VITALS — WEIGHT: 129 LBS | BODY MASS INDEX: 21.49 KG/M2 | HEIGHT: 65 IN

## 2024-09-16 DIAGNOSIS — K64.8 HEMORRHOIDS, COMPLICATED: Primary | ICD-10-CM

## 2024-09-16 PROCEDURE — 46221 LIGATION OF HEMORRHOID(S): CPT | Performed by: SURGERY

## 2024-09-16 PROCEDURE — 3008F BODY MASS INDEX DOCD: CPT | Performed by: SURGERY

## 2024-09-16 NOTE — PROCEDURES
9/16/2024    PREOP DX:  Complicated Internal Hemorrhoid  POSTOP DX:  same  PROCEDURE: Internal Hemorrhoid Banding x 2  ANESTHESIA:  Local  EBL:  minimal    Description:  Negar Layton is a  44 year old female who presents for internal hemorrhoidal banding session in the office. The patient was placed on the anoscopy table. The anoscope was placed in the anus. Poor prep was noted, stool in the rectum. The internal hemorrhoidal complex in the posterior position was grasped with the suction applicator and rubber bands were applied.  . Hemostasis was excellent. The patient tolerated the procedure well.    Follow up in 2 weeks for ongoing internal hemorrhoidal banding.

## 2024-09-30 ENCOUNTER — OFFICE VISIT (OUTPATIENT)
Dept: SURGERY | Facility: CLINIC | Age: 44
End: 2024-09-30

## 2024-09-30 VITALS — WEIGHT: 129 LBS | BODY MASS INDEX: 21 KG/M2

## 2024-09-30 DIAGNOSIS — K64.8 HEMORRHOIDS, COMPLICATED: Primary | ICD-10-CM

## 2024-09-30 PROCEDURE — 46221 LIGATION OF HEMORRHOID(S): CPT | Performed by: SURGERY

## 2024-09-30 NOTE — PROCEDURES
9/16/2024    PREOP DX:  Complicated Internal Hemorrhoid  POSTOP DX:  same  PROCEDURE: Internal Hemorrhoid Banding x 2  ANESTHESIA:  Local  EBL:  minimal    Description:  Negar Layton is a  44 year old female who presents for internal hemorrhoidal banding session in the office. The patient was placed on the anoscopy table. The anoscope was placed in the anus.   The internal hemorrhoidal complex in the posterior left position was grasped with the suction applicator and rubber bands were applied.   The internal hemorrhoidal complex in the posterior right position was grasped with the suction applicator and rubber bands were applied. Bands were more than 1.5 cm proximal to the dentate line. Hemostasis was excellent. The patient tolerated the procedure well.    Follow up in 2 weeks for ongoing internal hemorrhoidal banding.

## 2025-02-04 ENCOUNTER — HOSPITAL ENCOUNTER (OUTPATIENT)
Age: 45
Discharge: HOME | End: 2025-02-04
Payer: COMMERCIAL

## 2025-02-04 VITALS
HEART RATE: 75 BPM | OXYGEN SATURATION: 99 % | RESPIRATION RATE: 18 BRPM | SYSTOLIC BLOOD PRESSURE: 122 MMHG | DIASTOLIC BLOOD PRESSURE: 97 MMHG | TEMPERATURE: 98 F

## 2025-02-04 DIAGNOSIS — J02.0 ACUTE STREPTOCOCCAL PHARYNGITIS: Primary | ICD-10-CM

## 2025-02-04 DIAGNOSIS — Z20.822 ENCOUNTER FOR LABORATORY TESTING FOR COVID-19 VIRUS: ICD-10-CM

## 2025-02-04 LAB
S PYO AG THROAT QL: POSITIVE
SARS-COV-2 RNA RESP QL NAA+PROBE: NOT DETECTED

## 2025-02-04 PROCEDURE — 99214 OFFICE O/P EST MOD 30 MIN: CPT | Performed by: PHYSICIAN ASSISTANT

## 2025-02-04 PROCEDURE — 87880 STREP A ASSAY W/OPTIC: CPT | Performed by: PHYSICIAN ASSISTANT

## 2025-02-04 PROCEDURE — U0002 COVID-19 LAB TEST NON-CDC: HCPCS | Performed by: PHYSICIAN ASSISTANT

## 2025-02-04 RX ORDER — DEXAMETHASONE 4 MG/1
8 TABLET ORAL ONCE
Status: COMPLETED | OUTPATIENT
Start: 2025-02-04 | End: 2025-02-04

## 2025-02-04 RX ORDER — AMOXICILLIN 500 MG/1
500 CAPSULE ORAL 2 TIMES DAILY
Qty: 20 CAPSULE | Refills: 0 | Status: SHIPPED | OUTPATIENT
Start: 2025-02-04 | End: 2025-02-14

## 2025-02-04 NOTE — ED PROVIDER NOTES
Patient Seen in: [facility]      History     Chief Complaint   Patient presents with    Cough/URI     Stated Complaint: sore throat, cough, runny nose    Subjective:   HPI    Patient is a 44-year-old female, presenting to immediate care for evaluation of sore throat.  She has been experiencing cold-like symptoms for the last 5 days.  Symptoms include nasal congestion and cough.  Main concern is sore throat.  Associated pain and difficulty swallowing.  Taking over-the-counter cough medications for symptoms and ibuprofen with minimal improvement.  Coming to immediate care for further evaluation.  No fevers.  No facial swelling.  Trismus or drooling.  No chest pain or shortness of breath.  No lethargy, weakness, confusion.  Not immunocompromise    Objective:     Past Medical History:    Dense breast tissue on mammogram    Thyroid disease              Past Surgical History:   Procedure Laterality Date    Enlarge breast Bilateral 2013    Implantable breast prosthesis  2013                No pertinent social history.            Review of Systems   Constitutional:  Negative for chills and fever.   HENT:  Positive for congestion and sore throat.    Respiratory:  Positive for cough. Negative for shortness of breath.    Cardiovascular:  Negative for chest pain.   Gastrointestinal:  Negative for abdominal pain, diarrhea and vomiting.   Musculoskeletal:  Negative for neck pain and neck stiffness.   Skin:  Negative for rash.   Allergic/Immunologic: Negative for immunocompromised state.   Neurological:  Negative for dizziness, weakness, light-headedness and headaches.   Psychiatric/Behavioral:  Negative for confusion.    All other systems reviewed and are negative.      Positive for stated complaint: sore throat, cough, runny nose  Other systems are as noted in HPI.  Constitutional and vital signs reviewed.      All other systems reviewed and negative except as noted above.    Physical Exam     ED Triage Vitals  [02/04/25 0928]   BP (!) 122/97   Pulse 75   Resp 18   Temp 98 °F (36.7 °C)   Temp src Oral   SpO2 99 %   O2 Device None (Room air)       Current Vitals:   Vital Signs  BP: (!) 122/97  Pulse: 75  Resp: 18  Temp: 98 °F (36.7 °C)  Temp src: Oral    Oxygen Therapy  SpO2: 99 %  O2 Device: None (Room air)        Physical Exam  Vitals and nursing note reviewed.   Constitutional:       General: She is not in acute distress.     Appearance: Normal appearance. She is not ill-appearing, toxic-appearing or diaphoretic.   HENT:      Head: Normocephalic and atraumatic.      Right Ear: Tympanic membrane normal.      Left Ear: Tympanic membrane normal.      Nose: Congestion present.      Mouth/Throat:      Mouth: Mucous membranes are moist.      Pharynx: Posterior oropharyngeal erythema present. No oropharyngeal exudate.      Comments: Post nasal drip.  Uvula midline.  Tonsils enlarged 2+ bilateral erythematous without exudates.  No trismus or drooling  Eyes:      Conjunctiva/sclera: Conjunctivae normal.   Cardiovascular:      Rate and Rhythm: Normal rate and regular rhythm.      Pulses: Normal pulses.   Pulmonary:      Effort: Pulmonary effort is normal. No respiratory distress.      Breath sounds: Normal breath sounds. No stridor. No wheezing or rhonchi.      Comments: Lungs clear without rales or wheezing  Abdominal:      Palpations: Abdomen is soft.      Tenderness: There is no abdominal tenderness.   Musculoskeletal:         General: No swelling or tenderness. Normal range of motion.      Cervical back: Normal range of motion and neck supple. No rigidity.   Lymphadenopathy:      Cervical: Cervical adenopathy present.   Skin:     Capillary Refill: Capillary refill takes less than 2 seconds.   Neurological:      General: No focal deficit present.      Mental Status: She is alert and oriented to person, place, and time.      Motor: No weakness.      Coordination: Coordination normal.      Gait: Gait normal.   Psychiatric:          Mood and Affect: Mood normal.         Behavior: Behavior normal.         Thought Content: Thought content normal.         Judgment: Judgment normal.           ED Course     Labs Reviewed   POCT RAPID STREP - Abnormal; Notable for the following components:       Result Value    POCT Rapid Strep Positive (*)     All other components within normal limits   RAPID SARS-COV-2 BY PCR - Normal     Results for orders placed or performed during the hospital encounter of 02/04/25   Rapid SARS-CoV-2 by PCR    Collection Time: 02/04/25  9:32 AM    Specimen: Nares; Other   Result Value Ref Range    Rapid SARS-CoV-2 by PCR Not Detected Not Detected   POCT Rapid Strep    Collection Time: 02/04/25  9:39 AM   Result Value Ref Range    POCT Rapid Strep Positive (A) Negative            MDM     Differential diagnoses considered included, but are not exclusive of: viral upper respiratory infection, URI, pharyngitis, strep throat, tonsillitis, uvulitis, allergic rhinitis, etc.    Dx: Acute streptococcal pharyngitis, initial encounter  Strep point-of-care positive  COVID PCR negative  No RPA, no PTA  No Sarah's angina  Nontoxic-appearing  Well-appearing  Tolerating p.o.  No clinical signs dehydration  Treat with oral antibiotics amoxicillin twice daily for 10 days for strep throat  Motrin/Tylenol as needed for pain/fever  Cepacol lozenges and honey-tea for sore throat  OTC antitussive  Return precautions  Discharge instructions on strep pharyngitis        Medical Decision Making      Disposition and Plan     Clinical Impression:  1. Acute streptococcal pharyngitis    2. Encounter for laboratory testing for COVID-19 virus         Disposition:  Discharge  2/4/2025  9:50 am    Follow-up:  Karma Burden DO  [address] [id number] [phone]                Medications Prescribed:  Current Discharge Medication List        START taking these medications    Details   amoxicillin 500 MG Oral Cap Take 1 capsule (500 mg  total) by mouth 2 (two) times daily for 10 days.  Qty: 20 capsule, Refills: 0                 Supplementary Documentation:

## 2025-02-04 NOTE — ED INITIAL ASSESSMENT (HPI)
Pt came in due to sore throat, cough, and congestion for the past few days. Pt has easy non labored respirations.

## 2025-03-12 DIAGNOSIS — E03.9 HYPOTHYROIDISM (ACQUIRED): ICD-10-CM

## 2025-03-14 RX ORDER — LEVOTHYROXINE SODIUM 112 UG/1
112 TABLET ORAL
Qty: 90 TABLET | Refills: 3 | Status: SHIPPED | OUTPATIENT
Start: 2025-03-14

## 2025-03-14 NOTE — TELEPHONE ENCOUNTER
Refill passed per [facility] protocol.     Requested Prescriptions   Pending Prescriptions Disp Refills    LEVOTHYROXINE 112 MCG Oral Tab [Pharmacy Med Name: LEVOTHYROXINE 0.112MG (112MCG) TABS] 90 tablet 3     Sig: TAKE 1 TABLET(112 MCG) BY MOUTH BEFORE BREAKFAST       Thyroid Medication Protocol Passed - 3/14/2025 11:00 AM        Passed - TSH in past 12 months        Passed - Last TSH value is normal     Lab Results   Component Value Date    TSH 1.680 08/26/2024                 Passed - In person appointment or virtual visit in the past 12 mos or appointment in next 3 mos     Recent Out[facility]its              [facility], [facility]    5 months [facility]    6 months ago [facility]    10 months ago Hemorrhoids, complicated    [facility] [place] Michelle L, MD    Office Visit    1 year ago Acute cough    [facility] Libertad Crocker APRN    Telemedicine          Future Appointments         Provider Department Appt Notes    In 1 month Karma Burden DO [facility] Annual visit                    Passed - Medication is active on med list             [unfilled]      [unfilled]

## 2025-08-13 ENCOUNTER — LAB ENCOUNTER (OUTPATIENT)
Dept: LAB | Age: 45
End: 2025-08-13
Attending: FAMILY MEDICINE

## 2025-08-13 ENCOUNTER — TELEPHONE (OUTPATIENT)
Facility: CLINIC | Age: 45
End: 2025-08-13

## 2025-08-13 ENCOUNTER — OFFICE VISIT (OUTPATIENT)
Facility: CLINIC | Age: 45
End: 2025-08-13

## 2025-08-13 VITALS
BODY MASS INDEX: 22.99 KG/M2 | HEIGHT: 65 IN | OXYGEN SATURATION: 97 % | SYSTOLIC BLOOD PRESSURE: 116 MMHG | DIASTOLIC BLOOD PRESSURE: 78 MMHG | HEART RATE: 66 BPM | WEIGHT: 138 LBS

## 2025-08-13 DIAGNOSIS — Z12.31 VISIT FOR SCREENING MAMMOGRAM: ICD-10-CM

## 2025-08-13 DIAGNOSIS — E03.9 HYPOTHYROIDISM (ACQUIRED): ICD-10-CM

## 2025-08-13 DIAGNOSIS — F41.9 ANXIETY: ICD-10-CM

## 2025-08-13 DIAGNOSIS — R74.8 ELEVATED SERUM ALKALINE PHOSPHATASE LEVEL: ICD-10-CM

## 2025-08-13 DIAGNOSIS — Z12.11 COLON CANCER SCREENING: ICD-10-CM

## 2025-08-13 DIAGNOSIS — Z00.00 ENCOUNTER FOR ROUTINE ADULT HEALTH EXAMINATION WITHOUT ABNORMAL FINDINGS: Primary | ICD-10-CM

## 2025-08-13 DIAGNOSIS — Z00.00 ENCOUNTER FOR ROUTINE ADULT HEALTH EXAMINATION WITHOUT ABNORMAL FINDINGS: ICD-10-CM

## 2025-08-13 LAB
ALBUMIN SERPL-MCNC: 5.1 G/DL (ref 3.2–4.8)
ALBUMIN/GLOB SERPL: 2 (ref 1–2)
ALP LIVER SERPL-CCNC: 135 U/L (ref 37–98)
ALT SERPL-CCNC: 38 U/L (ref 10–49)
ANION GAP SERPL CALC-SCNC: 8 MMOL/L (ref 0–18)
AST SERPL-CCNC: 41 U/L (ref ?–34)
BASOPHILS # BLD AUTO: 0.03 X10(3) UL (ref 0–0.2)
BASOPHILS NFR BLD AUTO: 0.7 %
BILIRUB SERPL-MCNC: 0.8 MG/DL (ref 0.3–1.2)
BUN BLD-MCNC: 11 MG/DL (ref 9–23)
BUN/CREAT SERPL: 14.9 (ref 10–20)
CALCIUM BLD-MCNC: 10.2 MG/DL (ref 8.7–10.4)
CHLORIDE SERPL-SCNC: 100 MMOL/L (ref 98–112)
CHOLEST SERPL-MCNC: 230 MG/DL (ref ?–200)
CO2 SERPL-SCNC: 30 MMOL/L (ref 21–32)
CREAT BLD-MCNC: 0.74 MG/DL (ref 0.55–1.02)
DEPRECATED RDW RBC AUTO: 39.8 FL (ref 35.1–46.3)
EGFRCR SERPLBLD CKD-EPI 2021: 102 ML/MIN/1.73M2 (ref 60–?)
EOSINOPHIL # BLD AUTO: 0.09 X10(3) UL (ref 0–0.7)
EOSINOPHIL NFR BLD AUTO: 2.2 %
ERYTHROCYTE [DISTWIDTH] IN BLOOD BY AUTOMATED COUNT: 11.6 % (ref 11–15)
EST. AVERAGE GLUCOSE BLD GHB EST-MCNC: 85 MG/DL (ref 68–126)
FASTING PATIENT LIPID ANSWER: NO
FASTING STATUS PATIENT QL REPORTED: NO
GLOBULIN PLAS-MCNC: 2.6 G/DL (ref 2–3.5)
GLUCOSE BLD-MCNC: 83 MG/DL (ref 70–99)
HBA1C MFR BLD: 4.6 % (ref ?–5.7)
HCT VFR BLD AUTO: 36.8 % (ref 35–48)
HDLC SERPL-MCNC: 123 MG/DL (ref 40–59)
HGB BLD-MCNC: 12.9 G/DL (ref 12–16)
IMM GRANULOCYTES # BLD AUTO: 0.01 X10(3) UL (ref 0–1)
IMM GRANULOCYTES NFR BLD: 0.2 %
LDLC SERPL CALC-MCNC: 98 MG/DL (ref ?–100)
LYMPHOCYTES # BLD AUTO: 1.79 X10(3) UL (ref 1–4)
LYMPHOCYTES NFR BLD AUTO: 44.3 %
MCH RBC QN AUTO: 33.1 PG (ref 26–34)
MCHC RBC AUTO-ENTMCNC: 35.1 G/DL (ref 31–37)
MCV RBC AUTO: 94.4 FL (ref 80–100)
MONOCYTES # BLD AUTO: 0.4 X10(3) UL (ref 0.1–1)
MONOCYTES NFR BLD AUTO: 9.9 %
NEUTROPHILS # BLD AUTO: 1.72 X10 (3) UL (ref 1.5–7.7)
NEUTROPHILS # BLD AUTO: 1.72 X10(3) UL (ref 1.5–7.7)
NEUTROPHILS NFR BLD AUTO: 42.7 %
NONHDLC SERPL-MCNC: 107 MG/DL (ref ?–130)
OSMOLALITY SERPL CALC.SUM OF ELEC: 285 MOSM/KG (ref 275–295)
PLATELET # BLD AUTO: 210 10(3)UL (ref 150–450)
POTASSIUM SERPL-SCNC: 3.8 MMOL/L (ref 3.5–5.1)
PROT SERPL-MCNC: 7.7 G/DL (ref 5.7–8.2)
RBC # BLD AUTO: 3.9 X10(6)UL (ref 3.8–5.3)
SODIUM SERPL-SCNC: 138 MMOL/L (ref 136–145)
T4 FREE SERPL-MCNC: 1.3 NG/DL (ref 0.8–1.7)
TRIGL SERPL-MCNC: 53 MG/DL (ref 30–149)
TSI SER-ACNC: 5.55 UIU/ML (ref 0.55–4.78)
VLDLC SERPL CALC-MCNC: 9 MG/DL (ref 0–30)
WBC # BLD AUTO: 4 X10(3) UL (ref 4–11)

## 2025-08-13 PROCEDURE — 82977 ASSAY OF GGT: CPT | Performed by: FAMILY MEDICINE

## 2025-08-13 PROCEDURE — 84439 ASSAY OF FREE THYROXINE: CPT | Performed by: FAMILY MEDICINE

## 2025-08-13 PROCEDURE — 80050 GENERAL HEALTH PANEL: CPT | Performed by: FAMILY MEDICINE

## 2025-08-13 PROCEDURE — 80061 LIPID PANEL: CPT | Performed by: FAMILY MEDICINE

## 2025-08-13 PROCEDURE — 83036 HEMOGLOBIN GLYCOSYLATED A1C: CPT | Performed by: FAMILY MEDICINE

## 2025-08-14 LAB — GGT SERPL-CCNC: 216 U/L (ref ?–38)

## (undated) DIAGNOSIS — E03.9 HYPOTHYROIDISM (ACQUIRED): ICD-10-CM

## (undated) DIAGNOSIS — G47.33 OBSTRUCTIVE SLEEP APNEA (ADULT) (PEDIATRIC): Primary | ICD-10-CM